# Patient Record
Sex: MALE | Race: WHITE | Employment: OTHER | URBAN - METROPOLITAN AREA
[De-identification: names, ages, dates, MRNs, and addresses within clinical notes are randomized per-mention and may not be internally consistent; named-entity substitution may affect disease eponyms.]

---

## 2017-01-17 PROBLEM — M48.061 NEURAL FORAMINAL STENOSIS OF LUMBAR SPINE: Chronic | Status: ACTIVE | Noted: 2017-01-17

## 2017-10-05 PROBLEM — J44.9 CHRONIC OBSTRUCTIVE PULMONARY DISEASE (HCC): Status: ACTIVE | Noted: 2017-10-05

## 2018-03-12 ENCOUNTER — OFFICE VISIT (OUTPATIENT)
Dept: FAMILY MEDICINE CLINIC | Age: 53
End: 2018-03-12
Payer: MEDICARE

## 2018-03-12 VITALS
TEMPERATURE: 97.8 F | HEIGHT: 72 IN | WEIGHT: 315 LBS | RESPIRATION RATE: 20 BRPM | BODY MASS INDEX: 42.66 KG/M2 | HEART RATE: 68 BPM | OXYGEN SATURATION: 93 % | SYSTOLIC BLOOD PRESSURE: 138 MMHG | DIASTOLIC BLOOD PRESSURE: 78 MMHG

## 2018-03-12 DIAGNOSIS — J44.9 CHRONIC OBSTRUCTIVE PULMONARY DISEASE, UNSPECIFIED COPD TYPE (HCC): ICD-10-CM

## 2018-03-12 DIAGNOSIS — E11.9 TYPE 2 DIABETES MELLITUS WITHOUT COMPLICATION, WITHOUT LONG-TERM CURRENT USE OF INSULIN (HCC): Primary | ICD-10-CM

## 2018-03-12 DIAGNOSIS — E78.2 MIXED HYPERCHOLESTEROLEMIA AND HYPERTRIGLYCERIDEMIA: ICD-10-CM

## 2018-03-12 DIAGNOSIS — F41.9 ANXIETY: ICD-10-CM

## 2018-03-12 DIAGNOSIS — I10 BENIGN ESSENTIAL HTN: ICD-10-CM

## 2018-03-12 DIAGNOSIS — I73.9 PAD (PERIPHERAL ARTERY DISEASE) (HCC): ICD-10-CM

## 2018-03-12 DIAGNOSIS — E66.01 MORBID OBESITY WITH BMI OF 45.0-49.9, ADULT (HCC): ICD-10-CM

## 2018-03-12 DIAGNOSIS — I10 ESSENTIAL HYPERTENSION: ICD-10-CM

## 2018-03-12 PROCEDURE — G8926 SPIRO NO PERF OR DOC: HCPCS | Performed by: FAMILY MEDICINE

## 2018-03-12 PROCEDURE — 3023F SPIROM DOC REV: CPT | Performed by: FAMILY MEDICINE

## 2018-03-12 PROCEDURE — 4004F PT TOBACCO SCREEN RCVD TLK: CPT | Performed by: FAMILY MEDICINE

## 2018-03-12 PROCEDURE — 3017F COLORECTAL CA SCREEN DOC REV: CPT | Performed by: FAMILY MEDICINE

## 2018-03-12 PROCEDURE — G8417 CALC BMI ABV UP PARAM F/U: HCPCS | Performed by: FAMILY MEDICINE

## 2018-03-12 PROCEDURE — G8484 FLU IMMUNIZE NO ADMIN: HCPCS | Performed by: FAMILY MEDICINE

## 2018-03-12 PROCEDURE — G8427 DOCREV CUR MEDS BY ELIG CLIN: HCPCS | Performed by: FAMILY MEDICINE

## 2018-03-12 PROCEDURE — 3046F HEMOGLOBIN A1C LEVEL >9.0%: CPT | Performed by: FAMILY MEDICINE

## 2018-03-12 PROCEDURE — 99214 OFFICE O/P EST MOD 30 MIN: CPT | Performed by: FAMILY MEDICINE

## 2018-03-12 RX ORDER — BUSPIRONE HYDROCHLORIDE 15 MG/1
15 TABLET ORAL 2 TIMES DAILY
Qty: 60 TABLET | Refills: 4 | Status: ON HOLD
Start: 2018-03-12 | End: 2021-01-30

## 2018-03-12 RX ORDER — TORSEMIDE 10 MG/1
10 TABLET ORAL DAILY
Qty: 30 TABLET | Refills: 5 | Status: ON HOLD
Start: 2018-03-12 | End: 2021-02-04 | Stop reason: HOSPADM

## 2018-03-12 RX ORDER — RANITIDINE 300 MG/1
300 TABLET ORAL NIGHTLY
Qty: 90 TABLET | Refills: 0 | Status: ON HOLD
Start: 2018-03-12 | End: 2021-01-30

## 2018-03-12 RX ORDER — LEVOTHYROXINE SODIUM 0.05 MG/1
50 TABLET ORAL DAILY
Qty: 90 TABLET | Refills: 0 | Status: SHIPPED | OUTPATIENT
Start: 2018-03-12

## 2018-03-12 RX ORDER — PRAVASTATIN SODIUM 40 MG
40 TABLET ORAL DAILY
Qty: 30 TABLET | Refills: 5 | Status: ON HOLD
Start: 2018-03-12 | End: 2021-01-30

## 2018-03-12 RX ORDER — PENTOXIFYLLINE 400 MG/1
400 TABLET, EXTENDED RELEASE ORAL
Qty: 90 TABLET | Refills: 5 | Status: SHIPPED | OUTPATIENT
Start: 2018-03-12

## 2018-03-12 RX ORDER — LISINOPRIL 20 MG/1
20 TABLET ORAL DAILY
Qty: 30 TABLET | Refills: 5 | Status: ON HOLD
Start: 2018-03-12 | End: 2021-02-03 | Stop reason: HOSPADM

## 2018-03-12 RX ORDER — METOPROLOL TARTRATE 50 MG/1
50 TABLET, FILM COATED ORAL 2 TIMES DAILY
Qty: 60 TABLET | Refills: 3 | Status: SHIPPED | OUTPATIENT
Start: 2018-03-12

## 2018-03-12 NOTE — PROGRESS NOTES
daily as needed for Pain 5/2/17  Yes Reatha Bernheim, MD   metFORMIN (GLUCOPHAGE) 1000 MG tablet Take 1 tablet by mouth 2 times daily (with meals) 2/1/17  Yes Reatha Bernheim, MD   Aspirin Buf,CaCarb-MgCarb-MgO, 500 MG TABS Take by mouth 2 times daily Take 2 tabs  Stopped 5 days before   Yes Historical Provider, MD   glucose blood VI test strips (FREESTYLE LITE) strip 1 each by In Vitro route daily As needed.  12/12/16  Yes Reatha Bernheim, MD   Lancets MISC 1 each by Does not apply route daily 12/12/16  Yes Reatha Bernheim, MD   glucose monitoring kit (FREESTYLE) monitoring kit 1 kit by Does not apply route 2 times daily 12/12/16  Yes Reatha Bernheim, MD   Alcohol Swabs PADS 2 each by Does not apply route 2 times daily Please give patient 1 box 12/12/16  Yes Reatha Bernheim, MD   canagliflozin [facility]) 300 MG TABS tablet Take 1 tablet by mouth every morning (before breakfast) 12/12/16  Yes Reatha Bernheim, MD   EQ FIBER SUPPLEMENT PO Take 1 capsule by mouth 2 times daily   Yes Historical Provider, MD     Social History     Social History    Marital status: Single     Spouse name: N/A    Number of children: N/A    Years of education: N/A     Social History Main Topics    Smoking status: Former Smoker     Years: 25.00     Types: Cigarettes    Smokeless tobacco: Current User     Types: Chew      Comment: chews    Alcohol use Yes      Comment: occas    Drug use: No    Sexual activity: No     Other Topics Concern    None     Social History Narrative    None       I have reviewed Raulito's allergies, medications, problem list, medical, social and family history and have updated as needed in the electronic medical record  Review Of Systems:    Skin: no abnormal pigmentation, rash, scaling, itching, masses, hair or nail changes  Eyes: no blurring, diplopia, or eye pain  Ears/Nose/Throat: no hearing loss, tinnitus, vertigo, nosebleed, nasal congestion, rhinorrhea, sore throat  Respiratory: no cough, pleuritic chest pain,

## 2018-03-15 ENCOUNTER — TELEPHONE (OUTPATIENT)
Dept: FAMILY MEDICINE CLINIC | Age: 53
End: 2018-03-15

## 2018-03-27 ENCOUNTER — TELEPHONE (OUTPATIENT)
Dept: FAMILY MEDICINE CLINIC | Age: 53
End: 2018-03-27

## 2018-03-27 NOTE — TELEPHONE ENCOUNTER
Call placed to the pharmacy to check on the medication. Per them his med is fine and needs nothing.      Tried to call the patient and was unable to reach or leave a message

## 2018-04-16 ENCOUNTER — TELEPHONE (OUTPATIENT)
Dept: FAMILY MEDICINE CLINIC | Age: 53
End: 2018-04-16

## 2018-05-14 ENCOUNTER — TELEPHONE (OUTPATIENT)
Dept: ADMINISTRATIVE | Age: 53
End: 2018-05-14

## 2018-12-05 PROBLEM — E11.65 TYPE 2 DIABETES MELLITUS WITH HYPERGLYCEMIA, WITHOUT LONG-TERM CURRENT USE OF INSULIN (HCC): Status: ACTIVE | Noted: 2018-12-05

## 2019-08-02 ENCOUNTER — TELEPHONE (OUTPATIENT)
Dept: SURGERY | Age: 54
End: 2019-08-02

## 2020-03-11 ENCOUNTER — TELEPHONE (OUTPATIENT)
Dept: FAMILY MEDICINE CLINIC | Age: 55
End: 2020-03-11

## 2020-03-20 ENCOUNTER — TELEPHONE (OUTPATIENT)
Dept: NEUROLOGY | Age: 55
End: 2020-03-20

## 2020-07-10 ENCOUNTER — TELEPHONE (OUTPATIENT)
Dept: FAMILY MEDICINE CLINIC | Age: 55
End: 2020-07-10

## 2021-01-30 ENCOUNTER — HOSPITAL ENCOUNTER (INPATIENT)
Age: 56
LOS: 5 days | Discharge: HOME HEALTH | DRG: 683 | End: 2021-02-04
Attending: INTERNAL MEDICINE | Admitting: INTERNAL MEDICINE
Payer: MEDICARE

## 2021-01-30 PROBLEM — N17.9 AKI (ACUTE KIDNEY INJURY) (HCC): Status: ACTIVE | Noted: 2021-01-30

## 2021-01-30 LAB
ALBUMIN SERPL-MCNC: 4.5 G/DL (ref 3.5–5.2)
ALP BLD-CCNC: 58 U/L (ref 40–129)
ALT SERPL-CCNC: 5 U/L (ref 0–40)
ANION GAP SERPL CALCULATED.3IONS-SCNC: 15 MMOL/L (ref 7–16)
AST SERPL-CCNC: 17 U/L (ref 0–39)
BILIRUB SERPL-MCNC: 0.4 MG/DL (ref 0–1.2)
BUN BLDV-MCNC: 49 MG/DL (ref 6–20)
CALCIUM SERPL-MCNC: 8.9 MG/DL (ref 8.6–10.2)
CHLORIDE BLD-SCNC: 100 MMOL/L (ref 98–107)
CO2: 22 MMOL/L (ref 22–29)
CREAT SERPL-MCNC: 3 MG/DL (ref 0.7–1.2)
GFR AFRICAN AMERICAN: 26
GFR NON-AFRICAN AMERICAN: 22 ML/MIN/1.73
GLUCOSE BLD-MCNC: 159 MG/DL (ref 74–99)
HCT VFR BLD CALC: 43.5 % (ref 37–54)
HEMOGLOBIN: 13.7 G/DL (ref 12.5–16.5)
MCH RBC QN AUTO: 27.6 PG (ref 26–35)
MCHC RBC AUTO-ENTMCNC: 31.5 % (ref 32–34.5)
MCV RBC AUTO: 87.7 FL (ref 80–99.9)
METER GLUCOSE: 229 MG/DL (ref 74–99)
PDW BLD-RTO: 15.8 FL (ref 11.5–15)
PLATELET # BLD: 267 E9/L (ref 130–450)
PMV BLD AUTO: 11.1 FL (ref 7–12)
POTASSIUM REFLEX MAGNESIUM: 4.9 MMOL/L (ref 3.5–5)
RBC # BLD: 4.96 E12/L (ref 3.8–5.8)
REASON FOR REJECTION: NORMAL
REJECTED TEST: NORMAL
SODIUM BLD-SCNC: 137 MMOL/L (ref 132–146)
TOTAL PROTEIN: 7.1 G/DL (ref 6.4–8.3)
WBC # BLD: 8 E9/L (ref 4.5–11.5)

## 2021-01-30 PROCEDURE — G0378 HOSPITAL OBSERVATION PER HR: HCPCS

## 2021-01-30 PROCEDURE — 96366 THER/PROPH/DIAG IV INF ADDON: CPT

## 2021-01-30 PROCEDURE — 82962 GLUCOSE BLOOD TEST: CPT

## 2021-01-30 PROCEDURE — 85730 THROMBOPLASTIN TIME PARTIAL: CPT

## 2021-01-30 PROCEDURE — 96376 TX/PRO/DX INJ SAME DRUG ADON: CPT

## 2021-01-30 PROCEDURE — 96365 THER/PROPH/DIAG IV INF INIT: CPT

## 2021-01-30 PROCEDURE — 6360000002 HC RX W HCPCS: Performed by: EMERGENCY MEDICINE

## 2021-01-30 PROCEDURE — 36415 COLL VENOUS BLD VENIPUNCTURE: CPT

## 2021-01-30 PROCEDURE — 1200000000 HC SEMI PRIVATE

## 2021-01-30 PROCEDURE — 80053 COMPREHEN METABOLIC PANEL: CPT

## 2021-01-30 PROCEDURE — 85027 COMPLETE CBC AUTOMATED: CPT

## 2021-01-30 PROCEDURE — G0379 DIRECT REFER HOSPITAL OBSERV: HCPCS

## 2021-01-30 PROCEDURE — 2580000003 HC RX 258: Performed by: EMERGENCY MEDICINE

## 2021-01-30 RX ORDER — HEPARIN SODIUM 1000 [USP'U]/ML
10000 INJECTION, SOLUTION INTRAVENOUS; SUBCUTANEOUS ONCE
Status: DISCONTINUED | OUTPATIENT
Start: 2021-01-30 | End: 2021-01-31

## 2021-01-30 RX ORDER — POLYETHYLENE GLYCOL 3350 17 G/17G
17 POWDER, FOR SOLUTION ORAL DAILY PRN
Status: DISCONTINUED | OUTPATIENT
Start: 2021-01-30 | End: 2021-02-04 | Stop reason: HOSPADM

## 2021-01-30 RX ORDER — ONDANSETRON 2 MG/ML
4 INJECTION INTRAMUSCULAR; INTRAVENOUS EVERY 6 HOURS PRN
Status: DISCONTINUED | OUTPATIENT
Start: 2021-01-30 | End: 2021-02-04 | Stop reason: HOSPADM

## 2021-01-30 RX ORDER — HEPARIN SODIUM 1000 [USP'U]/ML
10000 INJECTION, SOLUTION INTRAVENOUS; SUBCUTANEOUS PRN
Status: DISCONTINUED | OUTPATIENT
Start: 2021-01-30 | End: 2021-01-31

## 2021-01-30 RX ORDER — PIOGLITAZONEHYDROCHLORIDE 45 MG/1
45 TABLET ORAL DAILY
Status: ON HOLD | COMMUNITY
End: 2021-02-03 | Stop reason: HOSPADM

## 2021-01-30 RX ORDER — PROMETHAZINE HYDROCHLORIDE 25 MG/1
12.5 TABLET ORAL EVERY 6 HOURS PRN
Status: DISCONTINUED | OUTPATIENT
Start: 2021-01-30 | End: 2021-02-04 | Stop reason: HOSPADM

## 2021-01-30 RX ORDER — ATORVASTATIN CALCIUM 80 MG/1
80 TABLET, FILM COATED ORAL DAILY
COMMUNITY

## 2021-01-30 RX ORDER — LORATADINE 10 MG/1
10 TABLET ORAL DAILY
COMMUNITY

## 2021-01-30 RX ORDER — SODIUM CHLORIDE 0.9 % (FLUSH) 0.9 %
10 SYRINGE (ML) INJECTION PRN
Status: DISCONTINUED | OUTPATIENT
Start: 2021-01-30 | End: 2021-02-04 | Stop reason: HOSPADM

## 2021-01-30 RX ORDER — HEPARIN SODIUM 1000 [USP'U]/ML
5000 INJECTION, SOLUTION INTRAVENOUS; SUBCUTANEOUS PRN
Status: DISCONTINUED | OUTPATIENT
Start: 2021-01-30 | End: 2021-01-31

## 2021-01-30 RX ORDER — ESCITALOPRAM OXALATE 10 MG/1
10 TABLET ORAL DAILY
COMMUNITY

## 2021-01-30 RX ORDER — ACETAMINOPHEN 650 MG/1
650 SUPPOSITORY RECTAL EVERY 6 HOURS PRN
Status: DISCONTINUED | OUTPATIENT
Start: 2021-01-30 | End: 2021-02-04 | Stop reason: HOSPADM

## 2021-01-30 RX ORDER — MELOXICAM 15 MG/1
15 TABLET ORAL DAILY
Status: ON HOLD | COMMUNITY
End: 2021-02-03 | Stop reason: HOSPADM

## 2021-01-30 RX ORDER — EMPAGLIFLOZIN 25 MG/1
25 TABLET, FILM COATED ORAL DAILY
COMMUNITY

## 2021-01-30 RX ORDER — HEPARIN SODIUM 10000 [USP'U]/100ML
5-30 INJECTION, SOLUTION INTRAVENOUS CONTINUOUS
Status: DISCONTINUED | OUTPATIENT
Start: 2021-01-30 | End: 2021-01-31

## 2021-01-30 RX ORDER — ACETAMINOPHEN 325 MG/1
650 TABLET ORAL EVERY 6 HOURS PRN
Status: DISCONTINUED | OUTPATIENT
Start: 2021-01-30 | End: 2021-02-04 | Stop reason: HOSPADM

## 2021-01-30 RX ORDER — SODIUM CHLORIDE 0.9 % (FLUSH) 0.9 %
10 SYRINGE (ML) INJECTION EVERY 12 HOURS SCHEDULED
Status: DISCONTINUED | OUTPATIENT
Start: 2021-01-30 | End: 2021-02-04 | Stop reason: HOSPADM

## 2021-01-30 RX ADMIN — Medication 10 ML: at 21:40

## 2021-01-30 RX ADMIN — HEPARIN SODIUM 18 UNITS/KG/HR: 10000 INJECTION, SOLUTION INTRAVENOUS at 21:15

## 2021-01-30 ASSESSMENT — PAIN DESCRIPTION - ORIENTATION: ORIENTATION: RIGHT

## 2021-01-30 ASSESSMENT — PAIN DESCRIPTION - LOCATION: LOCATION: KNEE

## 2021-01-30 ASSESSMENT — PAIN SCALES - GENERAL: PAINLEVEL_OUTOF10: 0

## 2021-01-31 ENCOUNTER — APPOINTMENT (OUTPATIENT)
Dept: CT IMAGING | Age: 56
DRG: 683 | End: 2021-01-31
Attending: INTERNAL MEDICINE
Payer: MEDICARE

## 2021-01-31 LAB
ANION GAP SERPL CALCULATED.3IONS-SCNC: 16 MMOL/L (ref 7–16)
APTT: 147.8 SEC (ref 24.5–35.1)
APTT: 200.4 SEC (ref 24.5–35.1)
APTT: 30.6 SEC (ref 24.5–35.1)
APTT: 81.5 SEC (ref 24.5–35.1)
BACTERIA: ABNORMAL /HPF
BILIRUBIN URINE: NEGATIVE
BLOOD, URINE: NEGATIVE
BUN BLDV-MCNC: 65 MG/DL (ref 6–20)
CALCIUM SERPL-MCNC: 8.9 MG/DL (ref 8.6–10.2)
CHLORIDE BLD-SCNC: 99 MMOL/L (ref 98–107)
CHLORIDE URINE RANDOM: 27 MMOL/L
CLARITY: CLEAR
CO2: 23 MMOL/L (ref 22–29)
COLOR: YELLOW
CREAT SERPL-MCNC: 2.5 MG/DL (ref 0.7–1.2)
CREATININE URINE: 115 MG/DL (ref 40–278)
D DIMER: 211 NG/ML DDU
GFR AFRICAN AMERICAN: 32
GFR NON-AFRICAN AMERICAN: 27 ML/MIN/1.73
GLUCOSE BLD-MCNC: 161 MG/DL (ref 74–99)
GLUCOSE URINE: >=1000 MG/DL
HCT VFR BLD CALC: 40.4 % (ref 37–54)
HEMOGLOBIN: 12.8 G/DL (ref 12.5–16.5)
KETONES, URINE: NEGATIVE MG/DL
LEUKOCYTE ESTERASE, URINE: NEGATIVE
MCH RBC QN AUTO: 27.7 PG (ref 26–35)
MCHC RBC AUTO-ENTMCNC: 31.7 % (ref 32–34.5)
MCV RBC AUTO: 87.4 FL (ref 80–99.9)
METER GLUCOSE: 152 MG/DL (ref 74–99)
METER GLUCOSE: 152 MG/DL (ref 74–99)
METER GLUCOSE: 157 MG/DL (ref 74–99)
METER GLUCOSE: 163 MG/DL (ref 74–99)
METER GLUCOSE: 165 MG/DL (ref 74–99)
NITRITE, URINE: NEGATIVE
OSMOLALITY URINE: 583 MOSM/KG (ref 300–900)
PDW BLD-RTO: 15.8 FL (ref 11.5–15)
PH UA: 6 (ref 5–9)
PLATELET # BLD: 254 E9/L (ref 130–450)
PMV BLD AUTO: 11.1 FL (ref 7–12)
POTASSIUM SERPL-SCNC: 4.4 MMOL/L (ref 3.5–5)
PROTEIN PROTEIN: 5 MG/DL (ref 0–12)
PROTEIN UA: NEGATIVE MG/DL
PROTEIN/CREAT RATIO: 0
PROTEIN/CREAT RATIO: 0 (ref 0–0.2)
RBC # BLD: 4.62 E12/L (ref 3.8–5.8)
RBC UA: ABNORMAL /HPF (ref 0–2)
SODIUM BLD-SCNC: 138 MMOL/L (ref 132–146)
SODIUM URINE: 63 MMOL/L
SPECIFIC GRAVITY UA: 1.02 (ref 1–1.03)
UROBILINOGEN, URINE: 0.2 E.U./DL
WBC # BLD: 6.4 E9/L (ref 4.5–11.5)
WBC UA: ABNORMAL /HPF (ref 0–5)

## 2021-01-31 PROCEDURE — 6360000002 HC RX W HCPCS: Performed by: INTERNAL MEDICINE

## 2021-01-31 PROCEDURE — 81001 URINALYSIS AUTO W/SCOPE: CPT

## 2021-01-31 PROCEDURE — 84156 ASSAY OF PROTEIN URINE: CPT

## 2021-01-31 PROCEDURE — G0378 HOSPITAL OBSERVATION PER HR: HCPCS

## 2021-01-31 PROCEDURE — 96376 TX/PRO/DX INJ SAME DRUG ADON: CPT

## 2021-01-31 PROCEDURE — 82436 ASSAY OF URINE CHLORIDE: CPT

## 2021-01-31 PROCEDURE — 96372 THER/PROPH/DIAG INJ SC/IM: CPT

## 2021-01-31 PROCEDURE — 6360000002 HC RX W HCPCS: Performed by: EMERGENCY MEDICINE

## 2021-01-31 PROCEDURE — 83935 ASSAY OF URINE OSMOLALITY: CPT

## 2021-01-31 PROCEDURE — 36415 COLL VENOUS BLD VENIPUNCTURE: CPT

## 2021-01-31 PROCEDURE — 85730 THROMBOPLASTIN TIME PARTIAL: CPT

## 2021-01-31 PROCEDURE — 6370000000 HC RX 637 (ALT 250 FOR IP): Performed by: EMERGENCY MEDICINE

## 2021-01-31 PROCEDURE — 84300 ASSAY OF URINE SODIUM: CPT

## 2021-01-31 PROCEDURE — 2580000003 HC RX 258: Performed by: EMERGENCY MEDICINE

## 2021-01-31 PROCEDURE — 82962 GLUCOSE BLOOD TEST: CPT

## 2021-01-31 PROCEDURE — 85027 COMPLETE CBC AUTOMATED: CPT

## 2021-01-31 PROCEDURE — 2500000003 HC RX 250 WO HCPCS: Performed by: INTERNAL MEDICINE

## 2021-01-31 PROCEDURE — 85378 FIBRIN DEGRADE SEMIQUANT: CPT

## 2021-01-31 PROCEDURE — 1200000000 HC SEMI PRIVATE

## 2021-01-31 PROCEDURE — 96366 THER/PROPH/DIAG IV INF ADDON: CPT

## 2021-01-31 PROCEDURE — 99221 1ST HOSP IP/OBS SF/LOW 40: CPT | Performed by: PSYCHIATRY & NEUROLOGY

## 2021-01-31 PROCEDURE — 72131 CT LUMBAR SPINE W/O DYE: CPT

## 2021-01-31 PROCEDURE — 96375 TX/PRO/DX INJ NEW DRUG ADDON: CPT

## 2021-01-31 PROCEDURE — 80048 BASIC METABOLIC PNL TOTAL CA: CPT

## 2021-01-31 PROCEDURE — 82570 ASSAY OF URINE CREATININE: CPT

## 2021-01-31 RX ORDER — LEVOTHYROXINE SODIUM 0.05 MG/1
50 TABLET ORAL
Status: DISCONTINUED | OUTPATIENT
Start: 2021-01-31 | End: 2021-02-04 | Stop reason: HOSPADM

## 2021-01-31 RX ORDER — ESCITALOPRAM OXALATE 10 MG/1
10 TABLET ORAL DAILY
Status: DISCONTINUED | OUTPATIENT
Start: 2021-01-31 | End: 2021-02-04 | Stop reason: HOSPADM

## 2021-01-31 RX ORDER — NICOTINE POLACRILEX 4 MG
15 LOZENGE BUCCAL PRN
Status: DISCONTINUED | OUTPATIENT
Start: 2021-01-31 | End: 2021-02-04 | Stop reason: HOSPADM

## 2021-01-31 RX ORDER — DEXTROSE MONOHYDRATE 50 MG/ML
100 INJECTION, SOLUTION INTRAVENOUS PRN
Status: DISCONTINUED | OUTPATIENT
Start: 2021-01-31 | End: 2021-02-04 | Stop reason: HOSPADM

## 2021-01-31 RX ORDER — HEPARIN SODIUM 10000 [USP'U]/ML
5000 INJECTION, SOLUTION INTRAVENOUS; SUBCUTANEOUS EVERY 8 HOURS
Status: DISCONTINUED | OUTPATIENT
Start: 2021-01-31 | End: 2021-02-04 | Stop reason: HOSPADM

## 2021-01-31 RX ORDER — METOPROLOL TARTRATE 50 MG/1
50 TABLET, FILM COATED ORAL 2 TIMES DAILY
Status: DISCONTINUED | OUTPATIENT
Start: 2021-01-31 | End: 2021-02-04 | Stop reason: HOSPADM

## 2021-01-31 RX ORDER — BUPROPION HYDROCHLORIDE 100 MG/1
100 TABLET ORAL 2 TIMES DAILY
Status: DISCONTINUED | OUTPATIENT
Start: 2021-01-31 | End: 2021-02-04 | Stop reason: HOSPADM

## 2021-01-31 RX ORDER — PENTOXIFYLLINE 400 MG/1
400 TABLET, EXTENDED RELEASE ORAL
Status: DISCONTINUED | OUTPATIENT
Start: 2021-01-31 | End: 2021-02-04 | Stop reason: HOSPADM

## 2021-01-31 RX ORDER — ATORVASTATIN CALCIUM 80 MG/1
80 TABLET, FILM COATED ORAL DAILY
Status: DISCONTINUED | OUTPATIENT
Start: 2021-01-31 | End: 2021-02-04 | Stop reason: HOSPADM

## 2021-01-31 RX ORDER — DEXTROSE MONOHYDRATE 25 G/50ML
12.5 INJECTION, SOLUTION INTRAVENOUS PRN
Status: DISCONTINUED | OUTPATIENT
Start: 2021-01-31 | End: 2021-02-04 | Stop reason: HOSPADM

## 2021-01-31 RX ADMIN — Medication 10 ML: at 07:45

## 2021-01-31 RX ADMIN — BUPROPION HYDROCHLORIDE 100 MG: 100 TABLET, FILM COATED ORAL at 08:58

## 2021-01-31 RX ADMIN — INSULIN LISPRO 2 UNITS: 100 INJECTION, SOLUTION INTRAVENOUS; SUBCUTANEOUS at 09:03

## 2021-01-31 RX ADMIN — INSULIN LISPRO 2 UNITS: 100 INJECTION, SOLUTION INTRAVENOUS; SUBCUTANEOUS at 11:12

## 2021-01-31 RX ADMIN — METOPROLOL TARTRATE 50 MG: 50 TABLET, FILM COATED ORAL at 20:25

## 2021-01-31 RX ADMIN — LEVOTHYROXINE SODIUM 50 MCG: 0.05 TABLET ORAL at 06:28

## 2021-01-31 RX ADMIN — Medication 10 ML: at 20:25

## 2021-01-31 RX ADMIN — HEPARIN SODIUM 15 UNITS/KG/HR: 10000 INJECTION, SOLUTION INTRAVENOUS at 02:00

## 2021-01-31 RX ADMIN — PENTOXIFYLLINE 400 MG: 400 TABLET, EXTENDED RELEASE ORAL at 18:13

## 2021-01-31 RX ADMIN — INSULIN LISPRO 1 UNITS: 100 INJECTION, SOLUTION INTRAVENOUS; SUBCUTANEOUS at 20:30

## 2021-01-31 RX ADMIN — BUPROPION HYDROCHLORIDE 100 MG: 100 TABLET, FILM COATED ORAL at 20:24

## 2021-01-31 RX ADMIN — INSULIN LISPRO 2 UNITS: 100 INJECTION, SOLUTION INTRAVENOUS; SUBCUTANEOUS at 18:14

## 2021-01-31 RX ADMIN — HEPARIN SODIUM 12 UNITS/KG/HR: 10000 INJECTION, SOLUTION INTRAVENOUS at 09:04

## 2021-01-31 RX ADMIN — ACETAMINOPHEN 650 MG: 325 TABLET, FILM COATED ORAL at 08:58

## 2021-01-31 RX ADMIN — HEPARIN SODIUM 5000 UNITS: 10000 INJECTION INTRAVENOUS; SUBCUTANEOUS at 11:13

## 2021-01-31 RX ADMIN — PENTOXIFYLLINE 400 MG: 400 TABLET, EXTENDED RELEASE ORAL at 12:10

## 2021-01-31 RX ADMIN — MICONAZOLE NITRATE: 20.6 POWDER TOPICAL at 20:25

## 2021-01-31 RX ADMIN — INSULIN LISPRO 1 UNITS: 100 INJECTION, SOLUTION INTRAVENOUS; SUBCUTANEOUS at 02:08

## 2021-01-31 RX ADMIN — HEPARIN SODIUM 5000 UNITS: 10000 INJECTION INTRAVENOUS; SUBCUTANEOUS at 18:15

## 2021-01-31 RX ADMIN — METOPROLOL TARTRATE 50 MG: 50 TABLET, FILM COATED ORAL at 08:59

## 2021-01-31 RX ADMIN — ESCITALOPRAM 10 MG: 10 TABLET, FILM COATED ORAL at 08:58

## 2021-01-31 RX ADMIN — HEPARIN SODIUM 12 UNITS/KG/HR: 10000 INJECTION, SOLUTION INTRAVENOUS at 07:59

## 2021-01-31 RX ADMIN — PENTOXIFYLLINE 400 MG: 400 TABLET, EXTENDED RELEASE ORAL at 08:58

## 2021-01-31 RX ADMIN — ACETAMINOPHEN 650 MG: 325 TABLET, FILM COATED ORAL at 01:02

## 2021-01-31 RX ADMIN — ATORVASTATIN CALCIUM 80 MG: 80 TABLET, FILM COATED ORAL at 08:58

## 2021-01-31 ASSESSMENT — PAIN SCALES - GENERAL: PAINLEVEL_OUTOF10: 7

## 2021-01-31 ASSESSMENT — PAIN DESCRIPTION - LOCATION: LOCATION: KNEE

## 2021-01-31 ASSESSMENT — PAIN DESCRIPTION - ONSET: ONSET: ON-GOING

## 2021-01-31 ASSESSMENT — PAIN DESCRIPTION - FREQUENCY: FREQUENCY: INTERMITTENT

## 2021-01-31 NOTE — H&P
INTERNAL MEDICINE HISTORY AND PHYSICAL EXAM     CHIEF COMPLAINT:   No chief complaint on file. HISTORY OF PRESENTING ILLNESS:   63years old male patient who presented to [facility], with chief complaint of pain weakness and decreased sensation of the right lower extremity. Patient has past medical history significant for hypertension hyperlipidemia diabetes mellitus anxiety depression PTSD hypothyroidism. He also has history of peripheral vascular disease GERD COPD obesity obstructive sleep apnea    Apparently he was at home and experienced sudden shooting pain and he collapsed, did not lose the consciousness after that he had leg pain at rest and his leg dangling on the side of the bed, he was unable to pull himself up. He was brought to the emergency department and underwent lab work of, his creatinine was mildly elevated x-ray of the hip wrist knee showed no acute fractures, head CT C-spine and L-spine were requested but when patient went to the CT scanner he underwent a severe panic attack, he has history of PTSD with close tight spaces secondary to a traumatic childhood experience. Patient was noted to have high D-dimer, given concern for VTE, patient was started on heparin drip    Patient was suggested to get a transfer to a different facility so that he can get MRI of the spine, and further studies for evaluating VTE like CTA chest and duplex lower extremity ultrasound.             PAST MEDICAL HISTORY  Past Medical History:   Diagnosis Date    Arthritis     GERD (gastroesophageal reflux disease)     Hyperlipidemia     Hypertension     Thyroid disease     Type 2 diabetes mellitus without complication, without long-term current use of insulin ([facility] [address] 12/12/2016    Type II or unspecified type diabetes mellitus without mention of complication, not stated as uncontrolled 7-    lexiscan stress test       PAST SURGICAL HISTORY  Past Surgical History:   Procedure Laterality Date    INGUINAL HERNIA REPAIR      infant    KNEE ARTHROSCOPY Right     1980's    OTHER SURGICAL HISTORY      cosmetic surgery mouth d/t dog bite    TONSILLECTOMY      VENTRAL HERNIA REPAIR  Sept, 2014    Open       FAMILY HISTORY  Family History   Problem Relation Age of Onset    Heart Disease Mother     Cancer Father           SOCIAL HISTORY   reports that he has quit smoking. His smoking use included cigarettes. He quit after 25.00 years of use. His smokeless tobacco use includes chew. He reports current alcohol use. He reports that he does not use drugs.       MEDICATIONS   Medications Prior to Admission: SITagliptin (JANUVIA) 100 MG tablet, Take 100 mg by mouth daily  atorvastatin (LIPITOR) 80 MG tablet, Take 80 mg by mouth daily  meloxicam (MOBIC) 15 MG tablet, Take 15 mg by mouth daily  empagliflozin (JARDIANCE) 25 MG tablet, Take 25 mg by mouth daily  escitalopram (LEXAPRO) 10 MG tablet, Take 10 mg by mouth daily  loratadine (CLARITIN) 10 MG tablet, Take 10 mg by mouth daily  pioglitazone (ACTOS) 45 MG tablet, Take 45 mg by mouth daily  levothyroxine (LEVOTHROID) 50 MCG tablet, Take 1 tablet by mouth daily  lisinopril (PRINIVIL) 20 MG tablet, Take 1 tablet by mouth daily  torsemide (DEMADEX) 10 MG tablet, Take 1 tablet by mouth daily  pentoxifylline (TRENTAL) 400 MG extended release tablet, Take 1 tablet by mouth 3 times daily (with meals)  metFORMIN (GLUCOPHAGE) 1000 MG tablet, Take 1 tablet by mouth 2 times daily (with meals)  metoprolol tartrate (LOPRESSOR) 50 MG tablet, Take 1 tablet by mouth 2 times daily  buPROPion (WELLBUTRIN) 100 MG tablet, Take 1 tablet by mouth 2 times daily  Cholecalciferol (VITAMIN D3) 5000 units TABS, Take 1 tablet by mouth daily  albuterol sulfate (PROAIR RESPICLICK) 475 (90 Base) MCG/ACT aerosol powder inhalation, Inhale 2 puffs into the lungs every 4 hours as needed for Wheezing or Shortness of Breath  glucose blood VI test strips (FREESTYLE LITE) strip, 1 each by In Vitro route daily As needed.   [DISCONTINUED] ranitidine (ZANTAC) 300 MG tablet, Take 1 tablet by mouth nightly  [DISCONTINUED] pravastatin (PRAVACHOL) 40 MG tablet, Take 1 tablet by mouth daily  [DISCONTINUED] busPIRone (BUSPAR) 15 MG tablet, Take 1 tablet by mouth 2 times daily  [DISCONTINUED] canagliflozin (INVOKANA) 300 MG TABS tablet, Take 1 tablet by mouth every morning (before breakfast)  [DISCONTINUED] cetirizine (ZYRTEC) 10 MG chewable tablet, Take 1 tablet by mouth daily  diclofenac (VOLTAREN) 50 MG EC tablet, Take 1 tablet by mouth daily as needed for Pain  [DISCONTINUED] Aspirin Buf,CaCarb-MgCarb-MgO, 500 MG TABS, Take by mouth 2 times daily Take 2 tabs Stopped 5 days before  Lancets MISC, 1 each by Does not apply route daily  glucose monitoring kit (FREESTYLE) monitoring kit, 1 kit by Does not apply route 2 times daily  Alcohol Swabs PADS, 2 each by Does not apply route 2 times daily Please give patient 1 box  EQ FIBER SUPPLEMENT PO, Take 1 capsule by mouth 2 times daily  Current Facility-Administered Medications   Medication Dose Route Frequency Provider Last Rate Last Admin    heparin (porcine) injection 10,000 Units  10,000 Units Intravenous Once Candy Kline MD        heparin (porcine) injection 10,000 Units  10,000 Units Intravenous PRN Candy Kline MD        heparin (porcine) injection 5,000 Units  5,000 Units Intravenous PRN Candy Kline MD        heparin 25,000 units in dextrose 5% 250 mL (premix) infusion  5-30 Units/kg/hr Intravenous Continuous Candy Kline MD 32.7 mL/hr at 01/30/21 2115 18 Units/kg/hr at 01/30/21 2115    sodium chloride flush 0.9 % injection 10 mL  10 mL Intravenous 2 times per day Candy Kline MD        sodium chloride flush 0.9 % injection 10 mL  10 mL Intravenous PRN Candy Kline MD        promethazine (PHENERGAN) tablet 12.5 mg  12.5 mg Oral Q6H PRN Candy Kline MD        Or    ondansetron [facility]) injection 4 mg  4 mg Intravenous Q6H PRN Renetta El MD        polyethylene glycol (GLYCOLAX) packet 17 g  17 g Oral Daily PRN Renetta El MD        acetaminophen (TYLENOL) tablet 650 mg  650 mg Oral Q6H PRN Renetta El MD        Or    acetaminophen (TYLENOL) suppository 650 mg  650 mg Rectal Q6H PRN Renetta El MD         Prior to Admission medications    Medication Sig Start Date End Date Taking?  Authorizing Provider   SITagliptin (JANUVIA) 100 MG tablet Take 100 mg by mouth daily   Yes Historical Provider, MD   atorvastatin (LIPITOR) 80 MG tablet Take 80 mg by mouth daily   Yes Historical Provider, MD   meloxicam (MOBIC) 15 MG tablet Take 15 mg by mouth daily   Yes Historical Provider, MD   empagliflozin (JARDIANCE) 25 MG tablet Take 25 mg by mouth daily   Yes Historical Provider, MD   escitalopram (LEXAPRO) 10 MG tablet Take 10 mg by mouth daily   Yes Historical Provider, MD   loratadine (CLARITIN) 10 MG tablet Take 10 mg by mouth daily   Yes Historical Provider, MD   pioglitazone (ACTOS) 45 MG tablet Take 45 mg by mouth daily   Yes Historical Provider, MD   levothyroxine (LEVOTHROID) 50 MCG tablet Take 1 tablet by mouth daily 3/12/18  Yes Aileen Ibarra MD   lisinopril (PRINIVIL) 20 MG tablet Take 1 tablet by mouth daily 3/12/18  Yes Aileen Ibarra MD   torsemide (DEMADEX) 10 MG tablet Take 1 tablet by mouth daily 3/12/18  Yes Aileen Ibarra MD   pentoxifylline (TRENTAL) 400 MG extended release tablet Take 1 tablet by mouth 3 times daily (with meals) 3/12/18  Yes Aileen Ibarra MD   metFORMIN (GLUCOPHAGE) 1000 MG tablet Take 1 tablet by mouth 2 times daily (with meals) 3/12/18  Yes Aileen Ibarra MD   metoprolol tartrate (LOPRESSOR) 50 MG tablet Take 1 tablet by mouth 2 times daily 3/12/18  Yes Aileen Ibarra MD   buPROPion [facility]) 100 MG tablet Take 1 tablet by mouth 2 times daily 2/16/18  Yes Yokasta Jacobs APRN - CNP   Cholecalciferol (VITAMIN D3) 5000 units TABS Take 1 tablet by mouth daily 9/5/17 Yes Yessy Ac MD   albuterol sulfate (PROAIR RESPICLICK) 609 (90 Base) MCG/ACT aerosol powder inhalation Inhale 2 puffs into the lungs every 4 hours as needed for Wheezing or Shortness of Breath 3/15/18   Yessy Ac MD   glucose blood VI test strips (FREESTYLE LITE) strip 1 each by In Vitro route daily As needed. 3/12/18   Yessy Ac MD   diclofenac (VOLTAREN) 50 MG EC tablet Take 1 tablet by mouth daily as needed for Pain 5/2/17   Yessy Ac MD   Lancets MISC 1 each by Does not apply route daily 12/12/16   Yessy Ac MD   glucose monitoring kit (FREESTYLE) monitoring kit 1 kit by Does not apply route 2 times daily 12/12/16   Yessy Ac MD   Alcohol Swabs PADS 2 each by Does not apply route 2 times daily Please give patient 1 box 12/12/16   Yessy Ac MD   EQ FIBER SUPPLEMENT PO Take 1 capsule by mouth 2 times daily    Historical Provider, MD       ALLERGIES   Benzalkonium chloride, Codeine, Hydrocodone-acetaminophen, Vicodin [hydrocodone-acetaminophen], and Neosporin [neomycin-polymyx-gramicid]    REVIEW OF SYSTEMS:  12 point review of system was done in detail with the patient and is negative except as above in HPI.     PHYSICAL EXAM:  VS: BP 97/65   Pulse 55   Temp 97 °F (36.1 °C) (Temporal)   Resp 20   Ht 6' (1.829 m)   Wt (!) 400 lb (181.4 kg)   SpO2 94%   BMI 54.25 kg/m²   General Appearance: alert and oriented to person, place and time, in no acute distress  HEENT: normocephalic and atraumatic, pupils equal, round, and reactive to light, Ssupple and non-tender without mass, no thyromegaly   Cardiovascular: normal rate, regular rhythm, normal S1 and S2, no murmurs, rubs, clicks, or gallops, distal pulses intact, no carotid bruits, no JVD  Pulmonary/Chest: clear to auscultation bilaterally- no wheezes, rales or rhonchi, normal air movement, no respiratory distress  Abdomen: soft, non-tender, non-distended, normal bowel sounds, no masses   Extremities: no cyanosis, clubbing or edema, pulse   Musculoskeletal: normal range of motion, no joint swelling, deformity or tenderness  Neurological: alert, oriented, normal speech, no focal findings or movement disorder noted  Skin: warm and dry, no rash or erythema    LABS:  No results found for this or any previous visit (from the past 24 hour(s)). RADIOLOGY:  No results found. ASSESSMENT AND PLAN  Active Problems:    RALF (acute kidney injury) ([facility] [address]  Resolved Problems:    * No resolved hospital problems. *    Impression  1. Right lower extremity weakness  2. Acute kidney injury  3. High D-dimer with a concern for pulmonary [place]  4. History of COPD  5. History of diabetes mellitus  6. Hypertension  7. Hyperlipidemia  8. Morbid obesity  9. Obstructive sleep apnea  10. Patient is full code    Plan  · We will request neurology evaluation for right lower extremity extremity weakness, further imaging per neuro recommendation  · Patient creatinine elevated than baseline, will gently hydrate, will get nephrology evaluation, nephro imaging per nephrology recommendation  · Low suspicion for pulmonary embolism, he was already started on heparin we will continue that for now, will order lower extremity duplex ultrasound, will order nuclear VQ scan  · Resume home medication chronic stable medical condition  · Incoming Hospitalist follow-up      DVT prophylaxis: Heparin  CODE STATUS: Patient is a full code  Discharge plan: Patient can be discharged next 3 to 4 days to home with and without home health.,  Pending clinical improvement, pending work-up and clearance by consulting services. Please note that over 35 minutes was spent in evaluating the patient, review of records and results, discussion with staff/family, etc.    Sade Wilson MD  [facility]   [phone]    NOTE: This report was transcribed using voice recognition software.  Every effort was made to ensure accuracy; however, inadvertent computerized transcription errors may be

## 2021-01-31 NOTE — PROGRESS NOTES
Ok to cancel Lumbar Spine x-rays per Dr. David Castaneda since the patient was able to get a CT scan of the Lumbar spine.

## 2021-01-31 NOTE — PROGRESS NOTES
Hospitalist Progress Note      Synopsis: Patient with a hx of known severe central lumbar canal stenosis admitted on 1/30/2021 as a transfer from [place] due to concern that pt had PE since d dimer was elevated. Pt reports chronic back pain and had an episode of worsening R leg pain and his leg gave out causing a fall.      Subjective    Pt reports continued intermittent back pain and now pain in his R knee  States that he has been trying to lose weight but because of the pandemic hasn't been able to get out  States his meals are limited to what he can do with a microwave  Denies shortness of breath or chest pain  He states that of late he has been taking 6-8 aspirin day for his back pain and he has been doing this for a year  Most recent lab check done 2 months ago and was told labs were normal at that time    Exam:  /62   Pulse 64   Temp 97.3 °F (36.3 °C) (Temporal)   Resp 18   Ht 6' (1.829 m)   Wt (!) 400 lb (181.4 kg)   SpO2 92%   BMI 54.25 kg/m²    General Appearance: alert and oriented to person, place and time, in no acute distress  HEENT: normocephalic and atraumatic, pupils equal, round, and reactive to light, Ssupple and non-tender without mass, no thyromegaly   Cardiovascular: normal rate, regular rhythm, normal S1 and S2, no murmurs, rubs, clicks, or gallops, distal pulses intact, no carotid bruits, no JVD  Pulmonary/Chest: clear to auscultation bilaterally- no wheezes, rales or rhonchi, normal air movement, no respiratory distress  Abdomen: soft, non-tender, non-distended, normal bowel sounds, no masses   Extremities: no cyanosis, clubbing or edema, pulse   Musculoskeletal: normal range of motion, no joint swelling, deformity or tenderness  Neurological: alert, oriented, normal speech, no focal findings or movement disorder noted  Skin: warm and dry, no rash or erythema; abrasion below R knee     Medications:  Reviewed    Infusion Medications    dextrose       Scheduled Medications    insulin lispro  0-12 Units Subcutaneous TID     insulin lispro  0-6 Units Subcutaneous Nightly    atorvastatin  80 mg Oral Daily    buPROPion  100 mg Oral BID    escitalopram  10 mg Oral Daily    levothyroxine  50 mcg Oral QAM AC    metoprolol tartrate  50 mg Oral BID    pentoxifylline  400 mg Oral TID     heparin (porcine)  5,000 Units Subcutaneous Q8H    miconazole   Topical BID    sodium chloride flush  10 mL Intravenous 2 times per day     PRN Meds: glucose, dextrose, glucagon (rDNA), dextrose, sodium chloride flush, promethazine **OR** ondansetron, polyethylene glycol, acetaminophen **OR** acetaminophen    I/O    Intake/Output Summary (Last 24 hours) at 1/31/2021 1547  Last data filed at 1/31/2021 1412  Gross per 24 hour   Intake --   Output 730 ml   Net -730 ml       Labs:   Recent Labs     01/30/21 2115 01/31/21  1010   WBC 8.0 6.4   HGB 13.7 12.8   HCT 43.5 40.4    254       Recent Labs     01/30/21 2115 01/31/21  1010    138   K 4.9 4.4    99   CO2 22 23   BUN 49* 65*   CREATININE 3.0* 2.5*   CALCIUM 8.9 8.9       Recent Labs     01/30/21 2115   PROT 7.1   ALKPHOS 58   ALT 5   AST 17   BILITOT 0.4       No results for input(s): INR in the last 72 hours. No results for input(s): Neldon Docker in the last 72 hours. Chronic labs:  Lab Results   Component Value Date    CHOL 137 01/11/2018    TRIG 382 (H) 01/11/2018    HDL 25 01/11/2018    LDLCALC 36 01/11/2018    TSH 2.500 01/16/2017    PSA 0.13 05/01/2017    INR 1.0 01/17/2017    LABA1C 9.9 01/09/2018       Radiology:  Imaging studies reviewed today.     ASSESSMENT:  RALF  Chronic lower back pain  COPD  DM2  HTN  HLD  Morbid obesity  MARTINEZ  Medical noncompliance    PLAN:  Strict I/O, monitor renal function  XR lumbar spine given that pt does not fit in our CT scanner  Dietician consult  Lifestyle modifications  Glycemic control  Avoid future NSAID use  Continue home meds as ordered   Normal D dimer, no oxygen

## 2021-01-31 NOTE — PLAN OF CARE
Problem: Pain:  Goal: Pain level will decrease  Description: Pain level will decrease  1/31/2021 1412 by Tyrone Sandoval RN  Outcome: Met This Shift     Problem: Pain:  Goal: Control of acute pain  Description: Control of acute pain  1/31/2021 1412 by Tyrone Sandoval RN  Outcome: Met This Shift     Problem: Pain:  Goal: Control of chronic pain  Description: Control of chronic pain  1/31/2021 1412 by Tyrone Sandoval RN  Outcome: Met This Shift     Problem: Skin Integrity:  Goal: Will show no infection signs and symptoms  Description: Will show no infection signs and symptoms  1/31/2021 1412 by Tyrone Sandoval RN  Outcome: Met This Shift     Problem: Skin Integrity:  Goal: Absence of new skin breakdown  Description: Absence of new skin breakdown  1/31/2021 1412 by Tyrone Sandoval RN  Outcome: Met This Shift     Problem: Falls - Risk of:  Goal: Will remain free from falls  Description: Will remain free from falls  1/31/2021 1412 by Tyrone Sandoval RN  Outcome: Met This Shift     Problem: Falls - Risk of:  Goal: Absence of physical injury  Description: Absence of physical injury  1/31/2021 1412 by Tyrone Sandoval RN  Outcome: Met This Shift

## 2021-01-31 NOTE — CONSULTS
Rajeev Shrestha is a 63 y.o. male       No chief complaint on file. CC: right leg weakness  HPI:  50-year-old man with history of known severe central lumbar canal stenosis as diagnosed on CT scan of the L-spine in 2016 presenting after fall with worsening back pain and weakness of the right leg with pain radiating down to the leg. He was previously seen by Dr. Kaley Doshi in 2017 and at that time his main symptoms were back pain and plan was for conservative management. He did follow-up with pain management but reported minimal success  HPI  Prior to Visit Medications    Medication Sig Taking?  Authorizing Provider   SITagliptin (JANUVIA) 100 MG tablet Take 100 mg by mouth daily Yes Historical Provider, MD   atorvastatin (LIPITOR) 80 MG tablet Take 80 mg by mouth daily Yes Historical Provider, MD   meloxicam (MOBIC) 15 MG tablet Take 15 mg by mouth daily Yes Historical Provider, MD   empagliflozin (JARDIANCE) 25 MG tablet Take 25 mg by mouth daily Yes Historical Provider, MD   escitalopram (LEXAPRO) 10 MG tablet Take 10 mg by mouth daily Yes Historical Provider, MD   loratadine (CLARITIN) 10 MG tablet Take 10 mg by mouth daily Yes Historical Provider, MD   pioglitazone (ACTOS) 45 MG tablet Take 45 mg by mouth daily Yes Historical Provider, MD   levothyroxine (LEVOTHROID) 50 MCG tablet Take 1 tablet by mouth daily Yes Yessy Ac MD   lisinopril (PRINIVIL) 20 MG tablet Take 1 tablet by mouth daily Yes Yessy Ac MD   torsemide (DEMADEX) 10 MG tablet Take 1 tablet by mouth daily Yes Yessy Ac MD   pentoxifylline (TRENTAL) 400 MG extended release tablet Take 1 tablet by mouth 3 times daily (with meals) Yes Yessy Ac MD   metFORMIN (GLUCOPHAGE) 1000 MG tablet Take 1 tablet by mouth 2 times daily (with meals) Yes Yessy Ac MD   metoprolol tartrate (LOPRESSOR) 50 MG tablet Take 1 tablet by mouth 2 times daily Yes Yessy Ac MD   buPROPion (WELLBUTRIN) 100 MG tablet Take 1 tablet by mouth 2 times daily Yes Colten Humphrey, APRN - CNP   Cholecalciferol (VITAMIN D3) 5000 units TABS Take 1 tablet by mouth daily Yes Ashish Grey MD   albuterol sulfate (PROAIR RESPICLICK) 618 (90 Base) MCG/ACT aerosol powder inhalation Inhale 2 puffs into the lungs every 4 hours as needed for Wheezing or Shortness of Breath  Ashish Grey MD   glucose blood VI test strips (FREESTYLE LITE) strip 1 each by In Vitro route daily As needed.   Ashish Grey MD   diclofenac (VOLTAREN) 50 MG EC tablet Take 1 tablet by mouth daily as needed for Pain  Ashish Grey MD   Lancets MISC 1 each by Does not apply route daily  Ashish Grey MD   glucose monitoring kit (FREESTYLE) monitoring kit 1 kit by Does not apply route 2 times daily  Ashish Grey MD   Alcohol Swabs PADS 2 each by Does not apply route 2 times daily Please give patient 1 box  Ashish Grey MD   EQ FIBER SUPPLEMENT PO Take 1 capsule by mouth 2 times daily  Historical Provider, MD     Social History     Tobacco Use    Smoking status: Former Smoker     Years: 25.00     Types: Cigarettes    Smokeless tobacco: Current User     Types: Chew    Tobacco comment: chews   Substance Use Topics    Alcohol use: Yes     Comment: occas    Drug use: No     Family History   Problem Relation Age of Onset    Heart Disease Mother     Cancer Father      Past Surgical History:   Procedure Laterality Date    INGUINAL HERNIA REPAIR      infant    KNEE ARTHROSCOPY Right     1980's    OTHER SURGICAL HISTORY      cosmetic surgery mouth d/t dog bite    TONSILLECTOMY      VENTRAL HERNIA REPAIR  Sept, 2014    Open     Past Medical History:   Diagnosis Date    Arthritis     GERD (gastroesophageal reflux disease)     Hyperlipidemia     Hypertension     Thyroid disease     Type 2 diabetes mellitus without complication, without long-term current use of insulin ([facility] [address] 12/12/2016    Type II or unspecified type diabetes mellitus without mention of complication, not stated as uncontrolled 7-    lexiscan stress test     Review of Systems      As above all others negative. Objective:   /62   Pulse 64   Temp 97.3 °F (36.3 °C) (Temporal)   Resp 18   Ht 6' (1.829 m)   Wt (!) 400 lb (181.4 kg)   SpO2 92%   BMI 54.25 kg/m²     Physical Exam  Constitutional:       Appearance: He is obese. HENT:      Head: Normocephalic and atraumatic. Mouth/Throat:      Mouth: Mucous membranes are dry. Eyes:      General: Lids are normal.      Extraocular Movements: Extraocular movements intact. Pupils: Pupils are equal, round, and reactive to light. Neck:      Musculoskeletal: Neck supple. Cardiovascular:      Rate and Rhythm: Normal rate and regular rhythm. Pulmonary:      Effort: Pulmonary effort is normal.   Abdominal:      Palpations: Abdomen is soft. Skin:     General: Skin is warm and dry. Neurological:      Mental Status: He is alert. Deep Tendon Reflexes:      Reflex Scores:       Tricep reflexes are 0 on the right side and 0 on the left side. Bicep reflexes are 0 on the right side and 0 on the left side. Patellar reflexes are 0 on the right side and 0 on the left side. Achilles reflexes are 0 on the right side and 0 on the left side. Psychiatric:         Speech: Speech normal.         Behavior: Behavior normal.                 Neurological Exam  Mental Status  Alert. Recent and remote memory are intact. Speech is normal. Language is fluent with no aphasia. Attention and concentration are normal.    Cranial Nerves  CN II: Visual fields full to confrontation. CN III, IV, VI: Extraocular movements intact bilaterally. Normal lids and orbits bilaterally. Pupils equal round and reactive to light bilaterally. CN V: Facial sensation is normal.  CN VII: Full and symmetric facial movement. CN VIII: Hearing is normal.  CN XI: Shoulder shrug strength is normal.  CN XII: Tongue midline without atrophy or fasciculations.     Motor  Normal muscle bulk throughout. Normal muscle tone. No abnormal involuntary movements. Strength is 5/5 in all four extremities except as noted. Rt le/5 at the ankle in flexion extension inversion eversion. .    Sensory  Sensation is intact to light touch, pinprick, vibration and proprioception in all four extremities. Reflexes                                           Right                      Left  Biceps                                 0                         0  Triceps                                0                         0  Patellar                                0                         0  Achilles                                0                         0    Coordination  Right: Finger-to-nose normal.  Left: Finger-to-nose normal.      Laboratory/Radiology:     CBC with Differential:    Lab Results   Component Value Date    WBC 6.4 2021    RBC 4.62 2021    HGB 12.8 2021    HCT 40.4 2021     2021    MCV 87.4 2021    MCH 27.7 2021    MCHC 31.7 2021    RDW 15.8 2021    SEGSPCT 68 2014    LYMPHOPCT 20.9 2018    MONOPCT 8.3 2018    BASOPCT 0.4 2018    MONOSABS 0.42 2018    LYMPHSABS 1.06 2018    EOSABS 0.16 2018    BASOSABS 0.02 2018     CMP:    Lab Results   Component Value Date     2021    K 4.4 2021    K 4.9 2021    CL 99 2021    CO2 23 2021    BUN 65 2021    CREATININE 2.5 2021    GFRAA 32 2021    LABGLOM 27 2021    GLUCOSE 161 2021    GLUCOSE 126 2012    PROT 7.1 2021    LABALBU 4.5 2021    LABALBU 4.3 2012    CALCIUM 8.9 2021    BILITOT 0.4 2021    ALKPHOS 58 2021    AST 17 2021    ALT 5 2021         I independently reviewed the labs and imaging studies at today's appointment. Assessment:       Suspect worsening lumbar diseases responsible for leg weakness and pain. Plan:     Unclear what imaging will be able to obtain given patient's size. At least a CT scan of areas required. Unable to get that when obtained x-rays and ask neurosurgery to evaluate.       Татьяна Hernandez  3:37 PM  1/31/2021

## 2021-02-01 LAB
ANION GAP SERPL CALCULATED.3IONS-SCNC: 13 MMOL/L (ref 7–16)
BUN BLDV-MCNC: 51 MG/DL (ref 6–20)
CALCIUM SERPL-MCNC: 9.2 MG/DL (ref 8.6–10.2)
CHLORIDE BLD-SCNC: 106 MMOL/L (ref 98–107)
CO2: 22 MMOL/L (ref 22–29)
CREAT SERPL-MCNC: 1.2 MG/DL (ref 0.7–1.2)
GFR AFRICAN AMERICAN: >60
GFR NON-AFRICAN AMERICAN: >60 ML/MIN/1.73
GLUCOSE BLD-MCNC: 128 MG/DL (ref 74–99)
HCT VFR BLD CALC: 43.3 % (ref 37–54)
HEMOGLOBIN: 13.1 G/DL (ref 12.5–16.5)
MAGNESIUM: 2.5 MG/DL (ref 1.6–2.6)
MCH RBC QN AUTO: 26.7 PG (ref 26–35)
MCHC RBC AUTO-ENTMCNC: 30.3 % (ref 32–34.5)
MCV RBC AUTO: 88.2 FL (ref 80–99.9)
METER GLUCOSE: 134 MG/DL (ref 74–99)
METER GLUCOSE: 143 MG/DL (ref 74–99)
METER GLUCOSE: 145 MG/DL (ref 74–99)
METER GLUCOSE: 151 MG/DL (ref 74–99)
PDW BLD-RTO: 15.7 FL (ref 11.5–15)
PHOSPHORUS: 3.6 MG/DL (ref 2.5–4.5)
PLATELET # BLD: 241 E9/L (ref 130–450)
PMV BLD AUTO: 11.5 FL (ref 7–12)
POTASSIUM SERPL-SCNC: 4.7 MMOL/L (ref 3.5–5)
RBC # BLD: 4.91 E12/L (ref 3.8–5.8)
SODIUM BLD-SCNC: 141 MMOL/L (ref 132–146)
WBC # BLD: 5.9 E9/L (ref 4.5–11.5)

## 2021-02-01 PROCEDURE — 96372 THER/PROPH/DIAG INJ SC/IM: CPT

## 2021-02-01 PROCEDURE — 6360000002 HC RX W HCPCS: Performed by: INTERNAL MEDICINE

## 2021-02-01 PROCEDURE — G0378 HOSPITAL OBSERVATION PER HR: HCPCS

## 2021-02-01 PROCEDURE — 82962 GLUCOSE BLOOD TEST: CPT

## 2021-02-01 PROCEDURE — 6370000000 HC RX 637 (ALT 250 FOR IP): Performed by: EMERGENCY MEDICINE

## 2021-02-01 PROCEDURE — 2580000003 HC RX 258: Performed by: EMERGENCY MEDICINE

## 2021-02-01 PROCEDURE — 80048 BASIC METABOLIC PNL TOTAL CA: CPT

## 2021-02-01 PROCEDURE — 85027 COMPLETE CBC AUTOMATED: CPT

## 2021-02-01 PROCEDURE — 2140000000 HC CCU INTERMEDIATE R&B

## 2021-02-01 PROCEDURE — 83735 ASSAY OF MAGNESIUM: CPT

## 2021-02-01 PROCEDURE — 36415 COLL VENOUS BLD VENIPUNCTURE: CPT

## 2021-02-01 PROCEDURE — 84100 ASSAY OF PHOSPHORUS: CPT

## 2021-02-01 RX ADMIN — ATORVASTATIN CALCIUM 80 MG: 80 TABLET, FILM COATED ORAL at 08:22

## 2021-02-01 RX ADMIN — ACETAMINOPHEN 650 MG: 325 TABLET, FILM COATED ORAL at 17:02

## 2021-02-01 RX ADMIN — MICONAZOLE NITRATE: 20.6 POWDER TOPICAL at 08:29

## 2021-02-01 RX ADMIN — METOPROLOL TARTRATE 50 MG: 50 TABLET, FILM COATED ORAL at 23:01

## 2021-02-01 RX ADMIN — PENTOXIFYLLINE 400 MG: 400 TABLET, EXTENDED RELEASE ORAL at 08:23

## 2021-02-01 RX ADMIN — METOPROLOL TARTRATE 50 MG: 50 TABLET, FILM COATED ORAL at 08:22

## 2021-02-01 RX ADMIN — Medication 10 ML: at 08:23

## 2021-02-01 RX ADMIN — PENTOXIFYLLINE 400 MG: 400 TABLET, EXTENDED RELEASE ORAL at 17:02

## 2021-02-01 RX ADMIN — ESCITALOPRAM 10 MG: 10 TABLET, FILM COATED ORAL at 08:22

## 2021-02-01 RX ADMIN — MICONAZOLE NITRATE: 20.6 POWDER TOPICAL at 23:00

## 2021-02-01 RX ADMIN — HEPARIN SODIUM 5000 UNITS: 10000 INJECTION INTRAVENOUS; SUBCUTANEOUS at 03:51

## 2021-02-01 RX ADMIN — ACETAMINOPHEN 650 MG: 325 TABLET, FILM COATED ORAL at 07:00

## 2021-02-01 RX ADMIN — INSULIN LISPRO 2 UNITS: 100 INJECTION, SOLUTION INTRAVENOUS; SUBCUTANEOUS at 11:46

## 2021-02-01 RX ADMIN — HEPARIN SODIUM 5000 UNITS: 10000 INJECTION INTRAVENOUS; SUBCUTANEOUS at 11:47

## 2021-02-01 RX ADMIN — BUPROPION HYDROCHLORIDE 100 MG: 100 TABLET, FILM COATED ORAL at 08:22

## 2021-02-01 RX ADMIN — PENTOXIFYLLINE 400 MG: 400 TABLET, EXTENDED RELEASE ORAL at 11:46

## 2021-02-01 RX ADMIN — LEVOTHYROXINE SODIUM 50 MCG: 0.05 TABLET ORAL at 06:59

## 2021-02-01 RX ADMIN — BUPROPION HYDROCHLORIDE 100 MG: 100 TABLET, FILM COATED ORAL at 23:02

## 2021-02-01 RX ADMIN — HEPARIN SODIUM 5000 UNITS: 10000 INJECTION INTRAVENOUS; SUBCUTANEOUS at 23:04

## 2021-02-01 RX ADMIN — INSULIN LISPRO 1 UNITS: 100 INJECTION, SOLUTION INTRAVENOUS; SUBCUTANEOUS at 23:03

## 2021-02-01 RX ADMIN — INSULIN LISPRO 2 UNITS: 100 INJECTION, SOLUTION INTRAVENOUS; SUBCUTANEOUS at 08:23

## 2021-02-01 RX ADMIN — Medication 10 ML: at 23:01

## 2021-02-01 ASSESSMENT — PAIN SCALES - GENERAL
PAINLEVEL_OUTOF10: 3
PAINLEVEL_OUTOF10: 3

## 2021-02-01 NOTE — CONSULTS
[address]                  [address] [facility], [address] [facility]                                  CONSULTATION    PATIENT NAME: Gera Hernandez                :        1965  MED REC NO:   [id number]                            ROOM:       8207  ACCOUNT NO:   [de-identified]                           ADMIT DATE: 2021  PROVIDER:     Gogo Pollack MD    CONSULT DATE:  2021    REASON FOR CONSULTATION:  Kidney disease. HISTORY OF PRESENT ILLNESS:  The patient is a 54-year-old male  presenting with chief complaint of right lower extremity decreased  sensation and discomfort. He initially presented to [facility] and subsequently transferred to [facility]. He has a history of morbid obesity, history of hypertension, history of  diabetes, hyperlipidemia among other medical problems. He now follows  up with  _____ as an outpatient. I saw him in his room. He is on room air. He is alert. He is  oriented. I was able to look at [address]. His  baseline creatinine used to run around 1. He presented over there with  a creatinine of 2.4 to 2.5. He got an ultrasound of the kidney that was  unrevealing. At home, he is on lisinopril as part of his medication  along with a diuretic. He reports to me he used to take Aleve and ibuprofen on a regular basis  and his primary care physician advised him to quit taking those. Currently, he is on Lipitor, levothyroxine, metoprolol. His blood  pressure is stable at 110/62. REVIEW OF SYSTEMS:  12-point done, negative except for those mentioned  above. ALLERGIES:  No known allergies. PAST MEDICAL HISTORY:  Includes morbid obesity, hypertension, diabetes,  hyperlipidemia. PAST SURGICAL HISTORY:  Includes inguinal hernia and knee replacement. SOCIAL HISTORY:  No smoking. No drinking. FAMILY HISTORY:  Reviewed and noncontributory.

## 2021-02-01 NOTE — FLOWSHEET NOTE
02/01/21 0345   Vital Signs   Temp 96.7 °F (35.9 °C)   Temp Source Temporal   Pulse 64   Heart Rate Source Monitor   Resp 20   BP (!) 163/76   BP Location Right upper arm   MAP (mmHg) 100   Patient Position Sitting   Level of Consciousness Alert (0)   MEWS Score 1   Patient Currently in Pain Yes   Pain Assessment   Pain Level 7   Oxygen Therapy   SpO2 95 %   O2 Device None (Room air)     Post fall - see previous note    Colette Loyola 02/01/21 4:05 AM

## 2021-02-01 NOTE — PROGRESS NOTES
Patient found sitting on floor in bathroom after nurse heard him yell out. He reports that his right knee 'gave out' and he fell down. He denies hitting his head, denies dizziness or feeling lightheaded. He is alert and oriented x4. Denies any new pain. No injuries noted. He was assisted to standing position using bathroom railing and three staff members. Patient ambulated back to bed with walker and standby assistance from staff - with steady gait. He was wearing non-skid slippers. Vital signs stable. Bed alarm activated and call light within reach. Wheels locked, side table close by with personal items within reach. Dr. Katharine Miller notified via [employer]. Jomar Pickens.  4:05 AM 02/01/21

## 2021-02-01 NOTE — PROGRESS NOTES
Hospitalist Progress Note      Synopsis: Patient with a hx of known severe central lumbar canal stenosis admitted on 1/30/2021 as a transfer from [place] due to concern that pt had PE since d dimer was elevated. Pt reports chronic back pain and had an episode of worsening R leg pain and his leg gave out causing a fall.      Subjective    Right leg is stronger  Pain is better    Exam:  /72   Pulse 78   Temp 97.7 °F (36.5 °C) (Oral)   Resp 20   Ht 6' (1.829 m)   Wt (!) 399 lb 6 oz (181.2 kg)   SpO2 92%   BMI 54.16 kg/m²    General Appearance: alert and oriented to person, place and time, in no acute distress  HEENT: normocephalic and atraumatic, pupils equal, round, and reactive to light, Ssupple and non-tender without mass, no thyromegaly   Cardiovascular: normal rate, regular rhythm, normal S1 and S2, no murmurs, rubs, clicks, or gallops, distal pulses intact, no carotid bruits, no JVD  Pulmonary/Chest: clear to auscultation bilaterally- no wheezes, rales or rhonchi, normal air movement, no respiratory distress  Abdomen: soft, non-tender, non-distended, normal bowel sounds, no masses   Extremities: no cyanosis, clubbing or edema, pulse   Musculoskeletal: normal range of motion, no joint swelling, deformity or tenderness  Neurological: alert, oriented, normal speech, no focal findings or movement disorder noted  Skin: warm and dry, no rash or erythema; abrasion below R knee     Medications:  Reviewed    Infusion Medications    dextrose       Scheduled Medications    insulin lispro  0-12 Units Subcutaneous TID WC    insulin lispro  0-6 Units Subcutaneous Nightly    atorvastatin  80 mg Oral Daily    buPROPion  100 mg Oral BID    escitalopram  10 mg Oral Daily    levothyroxine  50 mcg Oral QAM AC    metoprolol tartrate  50 mg Oral BID    pentoxifylline  400 mg Oral TID WC    heparin (porcine)  5,000 Units Subcutaneous Q8H    miconazole   Topical BID    sodium chloride flush  10 mL Intravenous 2 times per day     PRN Meds: glucose, dextrose, glucagon (rDNA), dextrose, sodium chloride flush, promethazine **OR** ondansetron, polyethylene glycol, acetaminophen **OR** acetaminophen    I/O    Intake/Output Summary (Last 24 hours) at 2/1/2021 1229  Last data filed at 2/1/2021 1123  Gross per 24 hour   Intake 360 ml   Output 3400 ml   Net -3040 ml       Labs:   Recent Labs     01/30/21 2115 01/31/21  1010 02/01/21  0731   WBC 8.0 6.4 5.9   HGB 13.7 12.8 13.1   HCT 43.5 40.4 43.3    254 241       Recent Labs     01/30/21 2115 01/31/21  1010    138   K 4.9 4.4    99   CO2 22 23   BUN 49* 65*   CREATININE 3.0* 2.5*   CALCIUM 8.9 8.9       Recent Labs     01/30/21 2115   PROT 7.1   ALKPHOS 58   ALT 5   AST 17   BILITOT 0.4       No results for input(s): INR in the last 72 hours. No results for input(s): Livingston Ripper in the last 72 hours. Chronic labs:  Lab Results   Component Value Date    CHOL 137 01/11/2018    TRIG 382 (H) 01/11/2018    HDL 25 01/11/2018    LDLCALC 36 01/11/2018    TSH 2.500 01/16/2017    PSA 0.13 05/01/2017    INR 1.0 01/17/2017    LABA1C 9.9 01/09/2018       Radiology:  Imaging studies reviewed today. ASSESSMENT:  RALF  Chronic lower back pain  COPD  DM2  HTN  HLD  Morbid obesity  MARTINEZ  Medical noncompliance    PLAN:  Strict I/O, monitor renal function  Ct lumbar spine result noted  Await neurosurgery input    Diet: DIET CARB CONTROL; Carb Control: 5 carb choices (75 gms)/meal  Code Status: Full Code  PT/OT Eval Status:   As needed  DVT Prophylaxis:   heparin  Recommended disposition at discharge:  home w [facility] at [place]     +++++++++++++++++++++++++++++++++++++++++++++++++  Blake Ascencio MD   [facility].  +++++++++++++++++++++++++++++++++++++++++++++++++  NOTE: This report was transcribed using voice recognition software.  Every effort was made to ensure accuracy; however, inadvertent computerized transcription errors may be present.

## 2021-02-01 NOTE — PROCEDURES
Please complete  MRI checklist so pt ready and cleared for [place] if case management decides to have him go there.

## 2021-02-01 NOTE — PLAN OF CARE
Problem: Falls - Risk of:  Goal: Will remain free from falls  Description: Will remain free from falls  2/1/2021 0408 by Ivan Guo RN  Outcome: Not Met This Shift    Problem: Pain:  Goal: Control of chronic pain  Description: Control of chronic pain  2/1/2021 0408 by Ivan Guo RN  Outcome: Ongoing    Problem: Pain:  Goal: Control of acute pain  Description: Control of acute pain  2/1/2021 0408 by Ivan Guo RN  Outcome: Ongoing

## 2021-02-01 NOTE — CARE COORDINATION
2/1/21 Transition of Care: patient is alert and oriented. He resides alone but has help from his family. He does not use assistive devices. He does not have any history with inpatient rehab. He is here due to a fall. His creat is 2. 5(baseline 1.0). Renal on case and holding lasix/lisinopril. Monitoring his labs. He has a neurosurgery consult but is to large for the MRI machine. Ct of spine shows canal stenosis. His pcp is Dr Hernando Yeboah and his pharmacy is [facility] in [place]. Patient currently on bedrest and unable to do PT. OT evals. Will follow for plan.  Jose Antonio Wynn RN CM

## 2021-02-01 NOTE — PLAN OF CARE
CT L spine reviewed-- severe central canal stenosis and neuroforaminal stenosis of lumbar spine     His problems are likely the result of his lumbar disease     NSGY has been consulted. Neuro will be available if needed, please call with questions.  Signing off     Robby Bedoya PA-C

## 2021-02-01 NOTE — PROCEDURES
2/1/21 3:50 pm - Notified Mohini Hernandez RN pt is over table weight limit. RN will see if case management will want to set him up for [address] open mri.

## 2021-02-02 LAB
ANION GAP SERPL CALCULATED.3IONS-SCNC: 15 MMOL/L (ref 7–16)
BUN BLDV-MCNC: 33 MG/DL (ref 6–20)
CALCIUM SERPL-MCNC: 9.1 MG/DL (ref 8.6–10.2)
CHLORIDE BLD-SCNC: 103 MMOL/L (ref 98–107)
CO2: 22 MMOL/L (ref 22–29)
CREAT SERPL-MCNC: 1.1 MG/DL (ref 0.7–1.2)
GFR AFRICAN AMERICAN: >60
GFR NON-AFRICAN AMERICAN: >60 ML/MIN/1.73
GLUCOSE BLD-MCNC: 183 MG/DL (ref 74–99)
METER GLUCOSE: 132 MG/DL (ref 74–99)
METER GLUCOSE: 138 MG/DL (ref 74–99)
METER GLUCOSE: 150 MG/DL (ref 74–99)
METER GLUCOSE: 200 MG/DL (ref 74–99)
POTASSIUM SERPL-SCNC: 4.8 MMOL/L (ref 3.5–5)
SODIUM BLD-SCNC: 140 MMOL/L (ref 132–146)

## 2021-02-02 PROCEDURE — G0378 HOSPITAL OBSERVATION PER HR: HCPCS

## 2021-02-02 PROCEDURE — 36415 COLL VENOUS BLD VENIPUNCTURE: CPT

## 2021-02-02 PROCEDURE — 6360000002 HC RX W HCPCS: Performed by: INTERNAL MEDICINE

## 2021-02-02 PROCEDURE — 6370000000 HC RX 637 (ALT 250 FOR IP): Performed by: EMERGENCY MEDICINE

## 2021-02-02 PROCEDURE — 2580000003 HC RX 258: Performed by: EMERGENCY MEDICINE

## 2021-02-02 PROCEDURE — 2140000000 HC CCU INTERMEDIATE R&B

## 2021-02-02 PROCEDURE — 6360000002 HC RX W HCPCS: Performed by: NURSE PRACTITIONER

## 2021-02-02 PROCEDURE — 96372 THER/PROPH/DIAG INJ SC/IM: CPT

## 2021-02-02 PROCEDURE — 82962 GLUCOSE BLOOD TEST: CPT

## 2021-02-02 PROCEDURE — 96375 TX/PRO/DX INJ NEW DRUG ADDON: CPT

## 2021-02-02 PROCEDURE — 80048 BASIC METABOLIC PNL TOTAL CA: CPT

## 2021-02-02 RX ORDER — FUROSEMIDE 10 MG/ML
40 INJECTION INTRAMUSCULAR; INTRAVENOUS DAILY
Status: DISCONTINUED | OUTPATIENT
Start: 2021-02-02 | End: 2021-02-04 | Stop reason: HOSPADM

## 2021-02-02 RX ADMIN — Medication 10 ML: at 21:15

## 2021-02-02 RX ADMIN — MICONAZOLE NITRATE: 20.6 POWDER TOPICAL at 09:23

## 2021-02-02 RX ADMIN — HEPARIN SODIUM 5000 UNITS: 10000 INJECTION INTRAVENOUS; SUBCUTANEOUS at 19:23

## 2021-02-02 RX ADMIN — BUPROPION HYDROCHLORIDE 100 MG: 100 TABLET, FILM COATED ORAL at 09:24

## 2021-02-02 RX ADMIN — METOPROLOL TARTRATE 50 MG: 50 TABLET, FILM COATED ORAL at 09:23

## 2021-02-02 RX ADMIN — HEPARIN SODIUM 5000 UNITS: 10000 INJECTION INTRAVENOUS; SUBCUTANEOUS at 06:27

## 2021-02-02 RX ADMIN — METOPROLOL TARTRATE 50 MG: 50 TABLET, FILM COATED ORAL at 21:10

## 2021-02-02 RX ADMIN — PENTOXIFYLLINE 400 MG: 400 TABLET, EXTENDED RELEASE ORAL at 11:55

## 2021-02-02 RX ADMIN — HEPARIN SODIUM 5000 UNITS: 10000 INJECTION INTRAVENOUS; SUBCUTANEOUS at 12:05

## 2021-02-02 RX ADMIN — ATORVASTATIN CALCIUM 80 MG: 80 TABLET, FILM COATED ORAL at 09:24

## 2021-02-02 RX ADMIN — INSULIN LISPRO 1 UNITS: 100 INJECTION, SOLUTION INTRAVENOUS; SUBCUTANEOUS at 21:10

## 2021-02-02 RX ADMIN — PENTOXIFYLLINE 400 MG: 400 TABLET, EXTENDED RELEASE ORAL at 17:37

## 2021-02-02 RX ADMIN — Medication 10 ML: at 09:24

## 2021-02-02 RX ADMIN — LEVOTHYROXINE SODIUM 50 MCG: 0.05 TABLET ORAL at 06:28

## 2021-02-02 RX ADMIN — PENTOXIFYLLINE 400 MG: 400 TABLET, EXTENDED RELEASE ORAL at 09:23

## 2021-02-02 RX ADMIN — INSULIN LISPRO 4 UNITS: 100 INJECTION, SOLUTION INTRAVENOUS; SUBCUTANEOUS at 17:37

## 2021-02-02 RX ADMIN — MICONAZOLE NITRATE: 20.6 POWDER TOPICAL at 21:10

## 2021-02-02 RX ADMIN — ACETAMINOPHEN 650 MG: 325 TABLET, FILM COATED ORAL at 12:51

## 2021-02-02 RX ADMIN — FUROSEMIDE 40 MG: 10 INJECTION INTRAMUSCULAR; INTRAVENOUS at 17:37

## 2021-02-02 RX ADMIN — ESCITALOPRAM 10 MG: 10 TABLET, FILM COATED ORAL at 09:24

## 2021-02-02 ASSESSMENT — PAIN SCALES - GENERAL
PAINLEVEL_OUTOF10: 5
PAINLEVEL_OUTOF10: 3

## 2021-02-02 ASSESSMENT — PAIN DESCRIPTION - LOCATION: LOCATION: BACK

## 2021-02-02 ASSESSMENT — PAIN DESCRIPTION - FREQUENCY: FREQUENCY: INTERMITTENT

## 2021-02-02 ASSESSMENT — PAIN DESCRIPTION - ORIENTATION: ORIENTATION: LOWER

## 2021-02-02 NOTE — CARE COORDINATION
2/2/21 Update CM Note: Call placed to [facility] to schedule open mri due to patients size. Questionnaire to be filled out. Per Alyssa Pollack at Open MRI patient cannot be scheduled until 2/3/21 as there are no openings. Scheduled for 2/3/21 at 11:15am to be picked up at 10:15am by [facility] (spoke with Lesly Scott). [phone]. Request for Bariatric stretcher. Per Lesly Scott they will wait and return patient to [facility] room 8207. Confirmation number for transport per Lesly Scott is [phone].   Jonathon Black RN, CM

## 2021-02-02 NOTE — PROGRESS NOTES
Faxed signed MRI checklist, physician order, and facesheet to [facility] for open MRI.

## 2021-02-02 NOTE — PROGRESS NOTES
[facility]   Progress Note    2/2/2021    SUBJECTIVE:   2/1:  Sitting at side of bed. Denies chest pain or SOB. Denies nausea, vomiting or diarrhea. Feeling a bit better. Still having right leg pain. 2/2:  Sitting at side of bed. Denies chest pain or SOB. Denies nausea, vomiting or abd pain. PROBLEM LIST:    Active Problems:    RALF (acute kidney injury) ([address]  Resolved Problems:    * No resolved hospital problems. *         DIET:    DIET CARB CONTROL; Carb Control: 5 carb choices (75 gms)/meal     MEDS (scheduled):    insulin lispro  0-12 Units Subcutaneous TID WC    insulin lispro  0-6 Units Subcutaneous Nightly    atorvastatin  80 mg Oral Daily    buPROPion  100 mg Oral BID    escitalopram  10 mg Oral Daily    levothyroxine  50 mcg Oral QAM AC    metoprolol tartrate  50 mg Oral BID    pentoxifylline  400 mg Oral TID WC    heparin (porcine)  5,000 Units Subcutaneous Q8H    miconazole   Topical BID    sodium chloride flush  10 mL Intravenous 2 times per day       MEDS (infusions):   dextrose         MEDS (prn):  glucose, dextrose, glucagon (rDNA), dextrose, sodium chloride flush, promethazine **OR** ondansetron, polyethylene glycol, acetaminophen **OR** acetaminophen    PHYSICAL EXAM:     Patient Vitals for the past 24 hrs:   BP Temp Temp src Pulse Resp   02/02/21 0920 (!) 154/99 97 °F (36.1 °C) Temporal 74 20   02/01/21 2015 129/69 97 °F (36.1 °C) Temporal 62 18   @      Intake/Output Summary (Last 24 hours) at 2/2/2021 1326  Last data filed at 2/1/2021 1850  Gross per 24 hour   Intake 480 ml   Output 600 ml   Net -120 ml         Wt Readings from Last 3 Encounters:   02/01/21 (!) 399 lb 6 oz (181.2 kg)   03/12/18 (!) 365 lb (165.6 kg)   01/09/18 (!) 356 lb (161.5 kg)       Constitutional:  in no acute distress  Oral: mucus membranes moist  Neck: no JVD. Trachea in midline  Cardiovascular: S1, S2 regular rhythm, no murmur,or rub  Respiratory:  No crackles, no wheeze. CTAB  Gastrointestinal:  Soft, nontender, nondistended, NABS  Ext: no edema, feet warm. Wound right knee. Skin: dry, no rash  Neuro: awake, alert, interactive      DATA:    Recent Labs     01/30/21 2115 01/31/21  1010 02/01/21  0731   WBC 8.0 6.4 5.9   HGB 13.7 12.8 13.1   HCT 43.5 40.4 43.3   MCV 87.7 87.4 88.2    254 241     Recent Labs     01/30/21 2115 01/31/21  1010 02/01/21  1136 02/02/21  0739    138 141 140   K 4.9 4.4 4.7 4.8    99 106 103   CO2 22 23 22 22   MG  --   --  2.5  --    PHOS  --   --  3.6  --    BUN 49* 65* 51* 33*   CREATININE 3.0* 2.5* 1.2 1.1   ALT 5  --   --   --    AST 17  --   --   --    BILITOT 0.4  --   --   --    ALKPHOS 58  --   --   --        Lab Results   Component Value Date    LABPROT 0.0 01/31/2021    LABPROT 0.0 01/31/2021       ASSESSMENT:  1.  Subacute kidney injury to chronic kidney disease. His baseline creatinine in 2020 was around 1. His current creatinine is around 2.5.   2.  Diabetes Mellitus  3. Hypertension  4.   HLD  5  Morbid obesity    -renal function stable - creatinine down to 1.1 mg/dl  -BP wnl this AM    RECOMMENDATIONS:  -Resume lasix qd  -monitor labs  -Monitor UO  -Monitor BP    Electronically signed by ASHLEY Bailon - CNP on 2/2/2021 at 1:26 PM

## 2021-02-02 NOTE — CONSULTS
the BMI of 54.25. He is alert, awake and oriented to time, place and  person. NEUROLOGIC:  Speech is good. Memory is good. Cranial nerves are  grossly intact. Pupils are equal and reactive. Extraocular movements  are full. Vision is full to confrontation. No focal motor or sensory  deficit noted in the upper extremities. He does have difficulty lifting  his right leg off the bed and also has an abrasion in the shin below the  knee on the right side. No significant tenderness noted over the  cervical, thoracic or lumbar spine. Rest of the neurologic examination  is intact. Impression: Lumbar spondylosis & radiculopathy  Morbid Obesity    RECOMMENDATIONS:  MRI scan of the cervical and lumbar spine. As the  patient is 400 pounds, would not be able to complete his test in the  hospital, has to be transported to other facility for the same. After  the MRI scan is completed, discussed with him the option of management  of his symptoms. We will follow along.         Berna De La Fuente MD    D: 02/01/2021 17:30:30       T: 02/01/2021 17:35:17     [email or web address]  Job#: [id number]     Doc#: [id number]    CC:

## 2021-02-02 NOTE — PROGRESS NOTES
Hospitalist Progress Note      Synopsis: Patient with a hx of known severe central lumbar canal stenosis admitted on 1/30/2021 as a transfer from [place] due to concern that pt had PE since d dimer was elevated. Pt reports chronic back pain and had an episode of worsening R leg pain and his leg gave out causing a fall.      Subjective    Denies new issues      Exam:  /69   Pulse 62   Temp 97 °F (36.1 °C) (Temporal)   Resp 18   Ht 6' (1.829 m)   Wt (!) 399 lb 6 oz (181.2 kg)   SpO2 92%   BMI 54.16 kg/m²    General Appearance: alert and oriented to person, place and time, in no acute distress  HEENT: normocephalic and atraumatic, pupils equal, round, and reactive to light, Ssupple and non-tender without mass, no thyromegaly   Cardiovascular: normal rate, regular rhythm, normal S1 and S2, no murmurs, rubs, clicks, or gallops, distal pulses intact, no carotid bruits, no JVD  Pulmonary/Chest: clear to auscultation bilaterally- no wheezes, rales or rhonchi, normal air movement, no respiratory distress  Abdomen: soft, non-tender, non-distended, normal bowel sounds, no masses   Extremities: no cyanosis, clubbing or edema, pulse   Musculoskeletal: normal range of motion, no joint swelling, deformity or tenderness  Neurological: alert, oriented, normal speech, no focal findings or movement disorder noted  Skin: warm and dry, no rash or erythema; abrasion below R knee     Medications:  Reviewed    Infusion Medications    dextrose       Scheduled Medications    insulin lispro  0-12 Units Subcutaneous TID WC    insulin lispro  0-6 Units Subcutaneous Nightly    atorvastatin  80 mg Oral Daily    buPROPion  100 mg Oral BID    escitalopram  10 mg Oral Daily    levothyroxine  50 mcg Oral QAM AC    metoprolol tartrate  50 mg Oral BID    pentoxifylline  400 mg Oral TID WC    heparin (porcine)  5,000 Units Subcutaneous Q8H    miconazole   Topical BID    sodium chloride flush  10 mL Intravenous 2 times per day     PRN Meds: glucose, dextrose, glucagon (rDNA), dextrose, sodium chloride flush, promethazine **OR** ondansetron, polyethylene glycol, acetaminophen **OR** acetaminophen    I/O    Intake/Output Summary (Last 24 hours) at 2/2/2021 0857  Last data filed at 2/1/2021 1850  Gross per 24 hour   Intake 840 ml   Output 1200 ml   Net -360 ml       Labs:   Recent Labs     01/30/21 2115 01/31/21  1010 02/01/21  0731   WBC 8.0 6.4 5.9   HGB 13.7 12.8 13.1   HCT 43.5 40.4 43.3    254 241       Recent Labs     01/31/21  1010 02/01/21  1136 02/02/21  0739    141 140   K 4.4 4.7 4.8   CL 99 106 103   CO2 23 22 22   BUN 65* 51* 33*   CREATININE 2.5* 1.2 1.1   CALCIUM 8.9 9.2 9.1   PHOS  --  3.6  --        Recent Labs     01/30/21 2115   PROT 7.1   ALKPHOS 58   ALT 5   AST 17   BILITOT 0.4       No results for input(s): INR in the last 72 hours. No results for input(s): Lewanda Bene in the last 72 hours. Chronic labs:  Lab Results   Component Value Date    CHOL 137 01/11/2018    TRIG 382 (H) 01/11/2018    HDL 25 01/11/2018    LDLCALC 36 01/11/2018    TSH 2.500 01/16/2017    PSA 0.13 05/01/2017    INR 1.0 01/17/2017    LABA1C 9.9 01/09/2018       Radiology:  Imaging studies reviewed today. ASSESSMENT:  RALF  Chronic lower back pain  COPD  DM2  HTN  HLD  Morbid obesity  MARTINEZ  Medical noncompliance    PLAN:  Strict I/O, monitor renal function  MRI cervical and lumbar spines pending  Neurosurgery following    Diet: DIET CARB CONTROL; Carb Control: 5 carb choices (75 gms)/meal  Code Status: Full Code  PT/OT Eval Status:   As needed  DVT Prophylaxis:   heparin  Recommended disposition at discharge:  home w hhc [place]     +++++++++++++++++++++++++++++++++++++++++++++++++  Sudha Mckeon MD   [facility].  +++++++++++++++++++++++++++++++++++++++++++++++++  NOTE: This report was transcribed using voice recognition software.  Every effort was made to ensure accuracy; however, inadvertent computerized transcription errors may be present.

## 2021-02-02 NOTE — PROGRESS NOTES
Lumbar Spondylosis. Obesity. Awaiting MRI scan to be completed.   Will make recommendations after reviewing that

## 2021-02-03 LAB
ANION GAP SERPL CALCULATED.3IONS-SCNC: 15 MMOL/L (ref 7–16)
BUN BLDV-MCNC: 35 MG/DL (ref 6–20)
CALCIUM SERPL-MCNC: 9.1 MG/DL (ref 8.6–10.2)
CHLORIDE BLD-SCNC: 100 MMOL/L (ref 98–107)
CO2: 22 MMOL/L (ref 22–29)
CREAT SERPL-MCNC: 1.2 MG/DL (ref 0.7–1.2)
GFR AFRICAN AMERICAN: >60
GFR NON-AFRICAN AMERICAN: >60 ML/MIN/1.73
GLUCOSE BLD-MCNC: 141 MG/DL (ref 74–99)
HBA1C MFR BLD: 7.2 % (ref 4–5.6)
HCT VFR BLD CALC: 41.6 % (ref 37–54)
HEMOGLOBIN: 13.4 G/DL (ref 12.5–16.5)
MCH RBC QN AUTO: 27.5 PG (ref 26–35)
MCHC RBC AUTO-ENTMCNC: 32.2 % (ref 32–34.5)
MCV RBC AUTO: 85.4 FL (ref 80–99.9)
METER GLUCOSE: 135 MG/DL (ref 74–99)
METER GLUCOSE: 145 MG/DL (ref 74–99)
METER GLUCOSE: 154 MG/DL (ref 74–99)
METER GLUCOSE: 196 MG/DL (ref 74–99)
PDW BLD-RTO: 15.5 FL (ref 11.5–15)
PLATELET # BLD: 240 E9/L (ref 130–450)
PMV BLD AUTO: 11.2 FL (ref 7–12)
POTASSIUM SERPL-SCNC: 4 MMOL/L (ref 3.5–5)
RBC # BLD: 4.87 E12/L (ref 3.8–5.8)
SODIUM BLD-SCNC: 137 MMOL/L (ref 132–146)
WBC # BLD: 6.4 E9/L (ref 4.5–11.5)

## 2021-02-03 PROCEDURE — 6370000000 HC RX 637 (ALT 250 FOR IP): Performed by: INTERNAL MEDICINE

## 2021-02-03 PROCEDURE — 80048 BASIC METABOLIC PNL TOTAL CA: CPT

## 2021-02-03 PROCEDURE — 85027 COMPLETE CBC AUTOMATED: CPT

## 2021-02-03 PROCEDURE — 96376 TX/PRO/DX INJ SAME DRUG ADON: CPT

## 2021-02-03 PROCEDURE — 6360000002 HC RX W HCPCS: Performed by: NURSE PRACTITIONER

## 2021-02-03 PROCEDURE — 82962 GLUCOSE BLOOD TEST: CPT

## 2021-02-03 PROCEDURE — 6370000000 HC RX 637 (ALT 250 FOR IP): Performed by: FAMILY MEDICINE

## 2021-02-03 PROCEDURE — G0378 HOSPITAL OBSERVATION PER HR: HCPCS

## 2021-02-03 PROCEDURE — 36415 COLL VENOUS BLD VENIPUNCTURE: CPT

## 2021-02-03 PROCEDURE — 96372 THER/PROPH/DIAG INJ SC/IM: CPT

## 2021-02-03 PROCEDURE — 6370000000 HC RX 637 (ALT 250 FOR IP): Performed by: EMERGENCY MEDICINE

## 2021-02-03 PROCEDURE — 2580000003 HC RX 258: Performed by: EMERGENCY MEDICINE

## 2021-02-03 PROCEDURE — 2140000000 HC CCU INTERMEDIATE R&B

## 2021-02-03 PROCEDURE — 83036 HEMOGLOBIN GLYCOSYLATED A1C: CPT

## 2021-02-03 PROCEDURE — 6360000002 HC RX W HCPCS: Performed by: INTERNAL MEDICINE

## 2021-02-03 RX ORDER — LISINOPRIL 5 MG/1
5 TABLET ORAL DAILY
Qty: 30 TABLET | Refills: 0 | Status: SHIPPED | OUTPATIENT
Start: 2021-02-04

## 2021-02-03 RX ORDER — LISINOPRIL 5 MG/1
5 TABLET ORAL DAILY
Status: DISCONTINUED | OUTPATIENT
Start: 2021-02-03 | End: 2021-02-04 | Stop reason: HOSPADM

## 2021-02-03 RX ADMIN — BUPROPION HYDROCHLORIDE 100 MG: 100 TABLET, FILM COATED ORAL at 08:15

## 2021-02-03 RX ADMIN — ESCITALOPRAM 10 MG: 10 TABLET, FILM COATED ORAL at 08:15

## 2021-02-03 RX ADMIN — Medication 10 ML: at 08:15

## 2021-02-03 RX ADMIN — Medication 10 ML: at 22:06

## 2021-02-03 RX ADMIN — BUPROPION HYDROCHLORIDE 100 MG: 100 TABLET, FILM COATED ORAL at 00:15

## 2021-02-03 RX ADMIN — HEPARIN SODIUM 5000 UNITS: 10000 INJECTION INTRAVENOUS; SUBCUTANEOUS at 04:14

## 2021-02-03 RX ADMIN — BUPROPION HYDROCHLORIDE 100 MG: 100 TABLET, FILM COATED ORAL at 20:34

## 2021-02-03 RX ADMIN — FUROSEMIDE 40 MG: 10 INJECTION INTRAMUSCULAR; INTRAVENOUS at 12:29

## 2021-02-03 RX ADMIN — INSULIN LISPRO 2 UNITS: 100 INJECTION, SOLUTION INTRAVENOUS; SUBCUTANEOUS at 16:57

## 2021-02-03 RX ADMIN — LEVOTHYROXINE SODIUM 50 MCG: 0.05 TABLET ORAL at 06:39

## 2021-02-03 RX ADMIN — INSULIN LISPRO 1 UNITS: 100 INJECTION, SOLUTION INTRAVENOUS; SUBCUTANEOUS at 20:35

## 2021-02-03 RX ADMIN — MICONAZOLE NITRATE: 20.6 POWDER TOPICAL at 12:42

## 2021-02-03 RX ADMIN — METOPROLOL TARTRATE 50 MG: 50 TABLET, FILM COATED ORAL at 20:34

## 2021-02-03 RX ADMIN — ATORVASTATIN CALCIUM 80 MG: 80 TABLET, FILM COATED ORAL at 08:14

## 2021-02-03 RX ADMIN — LISINOPRIL 5 MG: 5 TABLET ORAL at 12:43

## 2021-02-03 RX ADMIN — HEPARIN SODIUM 5000 UNITS: 10000 INJECTION INTRAVENOUS; SUBCUTANEOUS at 20:34

## 2021-02-03 RX ADMIN — ACETAMINOPHEN 650 MG: 325 TABLET, FILM COATED ORAL at 00:18

## 2021-02-03 RX ADMIN — SODIUM CHLORIDE, PRESERVATIVE FREE 10 ML: 5 INJECTION INTRAVENOUS at 12:29

## 2021-02-03 RX ADMIN — PENTOXIFYLLINE 400 MG: 400 TABLET, EXTENDED RELEASE ORAL at 08:15

## 2021-02-03 RX ADMIN — ACETAMINOPHEN 650 MG: 325 TABLET, FILM COATED ORAL at 08:14

## 2021-02-03 RX ADMIN — HEPARIN SODIUM 5000 UNITS: 10000 INJECTION INTRAVENOUS; SUBCUTANEOUS at 12:46

## 2021-02-03 RX ADMIN — BENZOCAINE AND MENTHOL 1 LOZENGE: 15; 2.6 LOZENGE ORAL at 00:11

## 2021-02-03 RX ADMIN — INSULIN LISPRO 2 UNITS: 100 INJECTION, SOLUTION INTRAVENOUS; SUBCUTANEOUS at 12:46

## 2021-02-03 RX ADMIN — PENTOXIFYLLINE 400 MG: 400 TABLET, EXTENDED RELEASE ORAL at 12:40

## 2021-02-03 RX ADMIN — PENTOXIFYLLINE 400 MG: 400 TABLET, EXTENDED RELEASE ORAL at 16:57

## 2021-02-03 RX ADMIN — MICONAZOLE NITRATE: 20.6 POWDER TOPICAL at 20:34

## 2021-02-03 RX ADMIN — METOPROLOL TARTRATE 50 MG: 50 TABLET, FILM COATED ORAL at 08:15

## 2021-02-03 ASSESSMENT — PAIN DESCRIPTION - PAIN TYPE: TYPE: ACUTE PAIN

## 2021-02-03 ASSESSMENT — PAIN DESCRIPTION - DESCRIPTORS: DESCRIPTORS: CONSTANT;DULL;THROBBING

## 2021-02-03 ASSESSMENT — PAIN DESCRIPTION - LOCATION: LOCATION: KNEE

## 2021-02-03 ASSESSMENT — PAIN SCALES - GENERAL
PAINLEVEL_OUTOF10: 8
PAINLEVEL_OUTOF10: 0
PAINLEVEL_OUTOF10: 8

## 2021-02-03 ASSESSMENT — PAIN DESCRIPTION - FREQUENCY: FREQUENCY: INTERMITTENT

## 2021-02-03 NOTE — PROGRESS NOTES
Lumbar Spondylosis. Morbid Obesity. Awaiting MRI scan to be completed.   Will make recommendations after reviewing that

## 2021-02-03 NOTE — PROGRESS NOTES
Patient unable to be discharged this evening- he states he cannot get into his residence without a walker. Only DME company able to deliver this evening, which is out of network. Patient wishes to use an in-network DME company for walker. This will be coordinated with social work in the morning. Additionally, patient states he does not have a ride this evening and would need to call the fire department.

## 2021-02-03 NOTE — PROGRESS NOTES
Received call from [place] at [address]. Patient refused to have MRI done, stating he did not think he would fit. [place] states that patient would have fit in MRI machine, but refused to try. Dr Royce Canavan notified via telephone.

## 2021-02-03 NOTE — PROGRESS NOTES
Hospitalist Progress Note      SYNOPSIS: Patient admitted on 2021 presented to [facility], with chief complaint of pain weakness and decreased sensation of the right lower extremity. Patient has past medical history significant for hypertension hyperlipidemia diabetes mellitus anxiety depression PTSD hypothyroidism. He also has history of peripheral vascular disease GERD COPD obesity obstructive sleep apnea     Apparently he was at home and experienced sudden shooting pain and he collapsed, did not lose the consciousness after that he had leg pain at rest and his leg dangling on the side of the bed, he was unable to pull himself up. He was brought to the emergency department and underwent lab work of, his creatinine was mildly elevated x-ray of the hip wrist knee showed no acute fractures, head CT C-spine and L-spine were requested but when patient went to the CT scanner he underwent a severe panic attack, he has history of PTSD with close tight spaces secondary to a traumatic childhood experience.     Patient was noted to have high D-dimer, given concern for VTE, patient was started on heparin drip     Patient was suggested to get a transfer to a different facility so that he can get MRI of the spine, and further studies for evaluating VTE like CTA chest and duplex lower extremity ultrasound. SUBJECTIVE:  Stable overnight. No other overnight issues reported. Patient seen and examined  Records reviewed.    For open MRI today  Complains of right leg pain    Temp (24hrs), Av.5 °F (36.4 °C), Min:96.2 °F (35.7 °C), Max:98.5 °F (36.9 °C)    DIET: DIET CARB CONTROL; Carb Control: 5 carb choices (75 gms)/meal  CODE: Full Code    Intake/Output Summary (Last 24 hours) at 2/3/2021 3455  Last data filed at 2/3/2021 0407  Gross per 24 hour   Intake 720 ml   Output 0 ml   Net 720 ml       Review of Systems  All bolded are positive; please see HPI  General:  Fever, chills, diaphoresis, SSI, Check hemoglobin A1c. · Hypertension- Restart low dose lisinopril due to kidney function returning to normal limits and HTN in the setting of DM  · Hyperlipidemia- Statin  · Morbid obesity Class III  · MARTINEZ  · Medical noncompliance    Due to patient obesity, lumbar stenosis, and ambulatory dysfunction, patient requires a wheeled walker. He states he used crutches previously and he fell with these. DISPOSITION: Awaiting MRI    Medications:  REVIEWED DAILY    Infusion Medications    dextrose       Scheduled Medications    furosemide  40 mg Intravenous Daily    insulin lispro  0-12 Units Subcutaneous TID WC    insulin lispro  0-6 Units Subcutaneous Nightly    atorvastatin  80 mg Oral Daily    buPROPion  100 mg Oral BID    escitalopram  10 mg Oral Daily    levothyroxine  50 mcg Oral QAM AC    metoprolol tartrate  50 mg Oral BID    pentoxifylline  400 mg Oral TID WC    heparin (porcine)  5,000 Units Subcutaneous Q8H    miconazole   Topical BID    sodium chloride flush  10 mL Intravenous 2 times per day     PRN Meds: Benzocaine-Menthol, glucose, dextrose, glucagon (rDNA), dextrose, sodium chloride flush, promethazine **OR** ondansetron, polyethylene glycol, acetaminophen **OR** acetaminophen    Labs:     Recent Labs     01/31/21  1010 02/01/21  0731 02/03/21  0600   WBC 6.4 5.9 6.4   HGB 12.8 13.1 13.4   HCT 40.4 43.3 41.6    241 240       Recent Labs     02/01/21  1136 02/02/21  0739 02/03/21  0600    140 137   K 4.7 4.8 4.0    103 100   CO2 22 22 22   BUN 51* 33* 35*   CREATININE 1.2 1.1 1.2   CALCIUM 9.2 9.1 9.1   PHOS 3.6  --   --        No results for input(s): PROT, ALB, ALKPHOS, ALT, AST, BILITOT, AMYLASE, LIPASE in the last 72 hours. No results for input(s): INR in the last 72 hours. No results for input(s): Nay Fidelina in the last 72 hours.     Chronic labs:    Lab Results   Component Value Date    CHOL 137 01/11/2018    TRIG 382 (H) 01/11/2018    HDL 25 01/11/2018    LDLCALC 36 01/11/2018    TSH 2.500 01/16/2017    PSA 0.13 05/01/2017    INR 1.0 01/17/2017    LABA1C 9.9 01/09/2018       Radiology: REVIEWED DAILY    +++++++++++++++++++++++++++++++++++++++++++++++++  Karan Gutierrez Physician - [address] [place] [address]  +++++++++++++++++++++++++++++++++++++++++++++++++  NOTE: This report was transcribed using voice recognition software. Every effort was made to ensure accuracy; however, inadvertent computerized transcription errors may be present.

## 2021-02-03 NOTE — PROGRESS NOTES
[facility]   Progress Note    2/3/2021    SUBJECTIVE:   2/1:  Sitting at side of bed. Denies chest pain or SOB. Denies nausea, vomiting or diarrhea. Feeling a bit better. Still having right leg pain. 2/2:  Sitting at side of bed. Denies chest pain or SOB. Denies nausea, vomiting or abd pain. 2/3: Patient was not in his room my conversation with the nurse was he was at open MRI ordered by neurosurgery for his back pain for the future recommendations and plan of therapy. PROBLEM LIST:    Active Problems:    RALF (acute kidney injury) ([address]  Resolved Problems:    * No resolved hospital problems.  *         DIET:    DIET CARB CONTROL; Carb Control: 5 carb choices (75 gms)/meal     MEDS (scheduled):    lisinopril  5 mg Oral Daily    furosemide  40 mg Intravenous Daily    insulin lispro  0-12 Units Subcutaneous TID WC    insulin lispro  0-6 Units Subcutaneous Nightly    atorvastatin  80 mg Oral Daily    buPROPion  100 mg Oral BID    escitalopram  10 mg Oral Daily    levothyroxine  50 mcg Oral QAM AC    metoprolol tartrate  50 mg Oral BID    pentoxifylline  400 mg Oral TID WC    heparin (porcine)  5,000 Units Subcutaneous Q8H    miconazole   Topical BID    sodium chloride flush  10 mL Intravenous 2 times per day       MEDS (infusions):   dextrose         MEDS (prn):  Benzocaine-Menthol, glucose, dextrose, glucagon (rDNA), dextrose, sodium chloride flush, promethazine **OR** ondansetron, polyethylene glycol, acetaminophen **OR** acetaminophen    PHYSICAL EXAM:     Patient Vitals for the past 24 hrs:   BP Temp Temp src Pulse Resp SpO2 Weight   02/03/21 0804 (!) 157/91 97.7 °F (36.5 °C) Oral 54 17 92 % --   02/03/21 0700 (!) 144/87 97.8 °F (36.6 °C) Temporal 63 20 95 % --   02/03/21 0407 -- -- -- -- -- -- (!) 392 lb 12.8 oz (178.2 kg)   02/03/21 0400 (!) 148/77 98.5 °F (36.9 °C) Temporal 58 20 93 % --   02/03/21 0030 (!) 146/76 98.4 °F (36.9 °C) Temporal 96 18 95 % --   02/02/21 0724 (!) 164/90 97 °F (36.1 °C) Temporal 60 20 95 % --   02/02/21 2015 (!) 167/96 97 °F (36.1 °C) Temporal 63 20 92 % --   02/02/21 1720 (!) 151/98 96.2 °F (35.7 °C) -- 66 18 96 % --   @      Intake/Output Summary (Last 24 hours) at 2/3/2021 1230  Last data filed at 2/3/2021 0859  Gross per 24 hour   Intake 600 ml   Output 250 ml   Net 350 ml         Wt Readings from Last 3 Encounters:   02/03/21 (!) 392 lb 12.8 oz (178.2 kg)   03/12/18 (!) 365 lb (165.6 kg)   01/09/18 (!) 356 lb (161.5 kg)       Constitutional:  in no acute distress  Oral: mucus membranes moist  Neck: no JVD. Trachea in midline  Cardiovascular: S1, S2 regular rhythm, no murmur,or rub  Respiratory:  No crackles, no wheeze. CTAB  Gastrointestinal:  Soft, nontender, nondistended, NABS  Ext: no edema, feet warm. Wound right knee. Skin: dry, no rash  Neuro: awake, alert, interactive      DATA:    Recent Labs     02/01/21  0731 02/03/21  0600   WBC 5.9 6.4   HGB 13.1 13.4   HCT 43.3 41.6   MCV 88.2 85.4    240     Recent Labs     02/01/21  1136 02/02/21  0739 02/03/21  0600    140 137   K 4.7 4.8 4.0    103 100   CO2 22 22 22   MG 2.5  --   --    PHOS 3.6  --   --    BUN 51* 33* 35*   CREATININE 1.2 1.1 1.2       Lab Results   Component Value Date    LABPROT 0.0 01/31/2021    LABPROT 0.0 01/31/2021       ASSESSMENT:  1.  Subacute kidney injury to chronic kidney disease. His baseline creatinine in 2020 was around 1. His current creatinine is around 2.5.   2.  Diabetes Mellitus  3. Hypertension  4.   HLD  5  Morbid obesity    -renal function stable - creatinine down to 1.1 mg/dl  -BP wnl this AM    RECOMMENDATIONS:  -Resume lasix qd  -monitor labs  -Monitor UO  -Monitor BP  Avoid radiocontrast, avoid gadolinium with MRI, avoid nonsteroidal anti-inflammatory medications and avoid phosphate enemas  Will follow BMP and CBC in a.m. and continue hydration if needed  Electronically signed by Omer Cornell MD on 2/3/2021 at 12:30 PM

## 2021-02-03 NOTE — PATIENT CARE CONFERENCE
P Quality Flow/Interdisciplinary Rounds Progress Note        Quality Flow Rounds held on February 3, 2021    Disciplines Attending:  Bedside Nurse, ,  and Nursing Unit Leadership    Paige Zaidi was admitted on 1/30/2021  7:20 PM    Anticipated Discharge Date:  Expected Discharge Date: 02/03/21    Disposition:    Rodney Score:  Rodney Scale Score: 18    Readmission Risk              Risk of Unplanned Readmission:        9           Discussed patient goal for the day, patient clinical progression, and barriers to discharge.   The following Goal(s) of the Day/Commitment(s) have been identified:  have MRI completed      Nataliya Poag  February 3, 2021

## 2021-02-03 NOTE — PLAN OF CARE
Problem: Pain:  Goal: Pain level will decrease  Description: Pain level will decrease  2/3/2021 0931 by Tyrone Rodriguez RN  Outcome: Met This Shift  2/3/2021 0516 by Juan Dallas RN  Outcome: Ongoing  Goal: Control of acute pain  Description: Control of acute pain  Outcome: Met This Shift  Goal: Control of chronic pain  Description: Control of chronic pain  Outcome: Met This Shift     Problem: Skin Integrity:  Goal: Will show no infection signs and symptoms  Description: Will show no infection signs and symptoms  2/3/2021 0516 by Juan Dallas RN  Outcome: Met This Shift     Problem: Falls - Risk of:  Goal: Will remain free from falls  Description: Will remain free from falls  2/3/2021 0516 by Juan Dallas RN  Outcome: Met This Shift

## 2021-02-04 VITALS
WEIGHT: 315 LBS | DIASTOLIC BLOOD PRESSURE: 70 MMHG | OXYGEN SATURATION: 94 % | BODY MASS INDEX: 42.66 KG/M2 | TEMPERATURE: 97 F | HEART RATE: 66 BPM | HEIGHT: 72 IN | RESPIRATION RATE: 18 BRPM | SYSTOLIC BLOOD PRESSURE: 114 MMHG

## 2021-02-04 LAB
ANION GAP SERPL CALCULATED.3IONS-SCNC: 16 MMOL/L (ref 7–16)
BUN BLDV-MCNC: 39 MG/DL (ref 6–20)
CALCIUM SERPL-MCNC: 9.1 MG/DL (ref 8.6–10.2)
CHLORIDE BLD-SCNC: 100 MMOL/L (ref 98–107)
CO2: 24 MMOL/L (ref 22–29)
CREAT SERPL-MCNC: 1 MG/DL (ref 0.7–1.2)
GFR AFRICAN AMERICAN: >60
GFR NON-AFRICAN AMERICAN: >60 ML/MIN/1.73
GLUCOSE BLD-MCNC: 158 MG/DL (ref 74–99)
METER GLUCOSE: 144 MG/DL (ref 74–99)
METER GLUCOSE: 149 MG/DL (ref 74–99)
POTASSIUM REFLEX MAGNESIUM: 4.8 MMOL/L (ref 3.5–5)
POTASSIUM SERPL-SCNC: 4.8 MMOL/L (ref 3.5–5)
SODIUM BLD-SCNC: 140 MMOL/L (ref 132–146)

## 2021-02-04 PROCEDURE — 82962 GLUCOSE BLOOD TEST: CPT

## 2021-02-04 PROCEDURE — 80048 BASIC METABOLIC PNL TOTAL CA: CPT

## 2021-02-04 PROCEDURE — 6370000000 HC RX 637 (ALT 250 FOR IP): Performed by: INTERNAL MEDICINE

## 2021-02-04 PROCEDURE — 96372 THER/PROPH/DIAG INJ SC/IM: CPT

## 2021-02-04 PROCEDURE — 6360000002 HC RX W HCPCS: Performed by: INTERNAL MEDICINE

## 2021-02-04 PROCEDURE — G0378 HOSPITAL OBSERVATION PER HR: HCPCS

## 2021-02-04 PROCEDURE — 6360000002 HC RX W HCPCS: Performed by: NURSE PRACTITIONER

## 2021-02-04 PROCEDURE — 2580000003 HC RX 258: Performed by: EMERGENCY MEDICINE

## 2021-02-04 PROCEDURE — 96376 TX/PRO/DX INJ SAME DRUG ADON: CPT

## 2021-02-04 PROCEDURE — 36415 COLL VENOUS BLD VENIPUNCTURE: CPT

## 2021-02-04 PROCEDURE — 6370000000 HC RX 637 (ALT 250 FOR IP): Performed by: EMERGENCY MEDICINE

## 2021-02-04 RX ORDER — FUROSEMIDE 40 MG/1
40 TABLET ORAL 2 TIMES DAILY
Qty: 60 TABLET | Refills: 0 | Status: SHIPPED | OUTPATIENT
Start: 2021-02-04 | End: 2021-03-06

## 2021-02-04 RX ADMIN — BUPROPION HYDROCHLORIDE 100 MG: 100 TABLET, FILM COATED ORAL at 09:16

## 2021-02-04 RX ADMIN — Medication 10 ML: at 09:17

## 2021-02-04 RX ADMIN — HEPARIN SODIUM 5000 UNITS: 10000 INJECTION INTRAVENOUS; SUBCUTANEOUS at 02:53

## 2021-02-04 RX ADMIN — INSULIN LISPRO 2 UNITS: 100 INJECTION, SOLUTION INTRAVENOUS; SUBCUTANEOUS at 11:30

## 2021-02-04 RX ADMIN — FUROSEMIDE 40 MG: 10 INJECTION INTRAMUSCULAR; INTRAVENOUS at 09:17

## 2021-02-04 RX ADMIN — INSULIN LISPRO 2 UNITS: 100 INJECTION, SOLUTION INTRAVENOUS; SUBCUTANEOUS at 09:17

## 2021-02-04 RX ADMIN — ESCITALOPRAM 10 MG: 10 TABLET, FILM COATED ORAL at 09:16

## 2021-02-04 RX ADMIN — ATORVASTATIN CALCIUM 80 MG: 80 TABLET, FILM COATED ORAL at 09:16

## 2021-02-04 RX ADMIN — LISINOPRIL 5 MG: 5 TABLET ORAL at 09:16

## 2021-02-04 RX ADMIN — PENTOXIFYLLINE 400 MG: 400 TABLET, EXTENDED RELEASE ORAL at 09:16

## 2021-02-04 RX ADMIN — HEPARIN SODIUM 5000 UNITS: 10000 INJECTION INTRAVENOUS; SUBCUTANEOUS at 11:31

## 2021-02-04 RX ADMIN — LEVOTHYROXINE SODIUM 50 MCG: 0.05 TABLET ORAL at 06:08

## 2021-02-04 RX ADMIN — METOPROLOL TARTRATE 50 MG: 50 TABLET, FILM COATED ORAL at 09:16

## 2021-02-04 RX ADMIN — PENTOXIFYLLINE 400 MG: 400 TABLET, EXTENDED RELEASE ORAL at 11:30

## 2021-02-04 ASSESSMENT — PAIN SCALES - GENERAL
PAINLEVEL_OUTOF10: 0
PAINLEVEL_OUTOF10: 0

## 2021-02-04 NOTE — PATIENT CARE CONFERENCE
P Quality Flow/Interdisciplinary Rounds Progress Note        Quality Flow Rounds held on February 4, 2021    Disciplines Attending:  Bedside Nurse, ,  and Nursing Unit Leadership    Graciela Callejas was admitted on 1/30/2021  7:20 PM    Anticipated Discharge Date:  Expected Discharge Date: 02/05/21    Disposition:    Rodney Score:  Rodney Scale Score: 18    Readmission Risk              Risk of Unplanned Readmission:        12           Discussed patient goal for the day, patient clinical progression, and barriers to discharge.   The following Goal(s) of the Day/Commitment(s) have been identified:  to get patient discharged by matthias Aleman  February 4, 2021

## 2021-02-04 NOTE — CARE COORDINATION
SOCIAL WORK/CASEMANAGEMENT TRANSITION OF CARE PLANNING( [address] [place] [address], [address], [place], [phone]):[facility] called [facility] night to fill fww but not in network and pt didn't a ride. Offered dme and hhc list and pt declined. He is in agreement to [employer] which is [employer] and health care solutions to deliver fww to room around 11 a.m. Wava Prim pt didn't want a fww he wants a standard walker. hcs said they will deliver the fww to the room since the drive is on his way and will deliver the standard legs to the home when they are out in the [place] area.  Jose Tesfaye  2/4/2021

## 2021-02-04 NOTE — PLAN OF CARE
Problem: Pain:  Goal: Pain level will decrease  Description: Pain level will decrease  2/4/2021 1155 by Jamey Frost RN  Outcome: Met This Shift  2/4/2021 0135 by Sidra Watson RN  Outcome: Met This Shift     Problem: Skin Integrity:  Goal: Will show no infection signs and symptoms  Description: Will show no infection signs and symptoms  2/4/2021 1155 by Jamey Frost RN  Outcome: Met This Shift  2/4/2021 0135 by Sidra Watson RN  Outcome: Met This Shift     Problem: Falls - Risk of:  Goal: Will remain free from falls  Description: Will remain free from falls  2/4/2021 1155 by Jamey Frost RN  Outcome: Met This Shift  2/4/2021 0135 by Sidra Watson RN  Outcome: Met This Shift

## 2021-02-04 NOTE — PROGRESS NOTES
CLINICAL PHARMACY NOTE: MEDS TO [address] Select Patient?: No  Total # of Prescriptions Filled: 2   The following medications were delivered to the patient:  · Lisinopril 5 mg  · Furosemide 40 mg  Total # of Interventions Completed: 3  Time Spent (min): 15    Additional Documentation:

## 2021-02-04 NOTE — PROGRESS NOTES
The patient could not complete MRI studies. Wants to try [facility] in [place] for his MRI scan. The patient may be discharged from neurosurgical standpoint. Follow up in the office in 3-4 weeks.  [phone]

## 2021-02-04 NOTE — DISCHARGE SUMMARY
Discharge Summary    Admit date: 1/30/2021    Discharge date and time: No discharge date for patient encounter. Admitting Physician: Brody Rankin MD     Consultants: Neurosurgery    Admission Diagnoses:  Right lower extremity weakness/RALF    Discharge Diagnoses and Hospital Course:  · RALF- Resolved. Nephrology followed. Monitor UO and BP. Avoid NSAIDs. · Chronic lower back pain with severe central canal stenosis at L3/L4- Neurosurgery following. Went for open MRI however patient refused, states he would not fit. Will follow with neurosurgery outpatient. · COPD  · Diabetes mellitus type II- Continue home medications. · Hypertension- Restart low dose lisinopril due to kidney function returning to normal limits and HTN in the setting of DM  · Hyperlipidemia- Statin  · Morbid obesity Class III  · MARTINEZ  · Medical noncompliance    Discharge Exam:  Vitals:    02/04/21 0815   BP: 131/73   Pulse: 65   Resp: 18   Temp: 96.7 °F (35.9 °C)   SpO2: 91%       General appearance:  awake, alert, and oriented to person, place, time, and purpose; appears stated age and cooperative; no apparent distress no labored breathing obese  HEENT:  Conjunctivae/corneas clear. Neck: Supple. No jugular venous distention. Respiratory: symmetrical; clear to auscultation bilaterally; no wheezes; no rhonchi; no rales  Cardiovascular: rhythm regular; rate controlled; no murmurs  Abdomen: Soft, nontender, nondistended  Extremities:  peripheral pulses present; no peripheral edema; no ulcers  Musculoskeletal: No clubbing, cyanosis, no bilateral lower extremity edema. Brisk capillary refill. Skin:  No rashes  on visible skin  Neurologic: awake, alert and following commands     Disposition: home with [place] [address]  The patient's condition is fair. At this time the patient is without objective evidence of an acute process requiring continuing hospitalization or inpatient management. They are stable for discharge with outpatient follow-up.      I have spoken with the patient and discussed the results of the current hospitalization, in addition to providing specific details for the plan of care and counseling regarding the diagnosis and prognosis. The plan has been discussed in detail and they are aware of the specific conditions for emergent return, as well as the importance of follow-up. Their questions are answered at this time and they are agreeable with the plan for discharge to home     Patient Instructions: Follow up with PCP within one week for pain management. Avoid NSAIDS due to RALF. Follow up with neurosurgery for back pain plan. No future appointments. Discharge Medications:     Medication List      START taking these medications    furosemide 40 MG tablet  Commonly known as: Lasix  Take 1 tablet by mouth 2 times daily     miconazole 2 % powder  Commonly known as: MICOTIN  Apply topically 2 times daily. CHANGE how you take these medications    lisinopril 5 MG tablet  Commonly known as: PRINIVIL;ZESTRIL  Take 1 tablet by mouth daily  What changed:   · medication strength  · how much to take     SITagliptin 100 MG tablet  Commonly known as: Januvia  Take 1 tablet by mouth every morning (before breakfast)  What changed: when to take this        CONTINUE taking these medications    albuterol sulfate 108 (90 Base) MCG/ACT aerosol powder inhalation  Commonly known as: ProAir RespiClick  Inhale 2 puffs into the lungs every 4 hours as needed for Wheezing or Shortness of Breath     Alcohol Swabs Pads  2 each by Does not apply route 2 times daily Please give patient 1 box     atorvastatin 80 MG tablet  Commonly known as: LIPITOR     blood glucose test strips strip  Commonly known as: FREESTYLE LITE  1 each by In Vitro route daily As needed.      buPROPion 100 MG tablet  Commonly known as: WELLBUTRIN  Take 1 tablet by mouth 2 times daily     EQ FIBER SUPPLEMENT PO     escitalopram 10 MG tablet  Commonly known as: LEXAPRO     glucose monitoring kit monitoring kit  1 kit by Does not apply route 2 times daily     Jardiance 25 MG tablet  Generic drug: empagliflozin     Lancets Misc  1 each by Does not apply route daily     levothyroxine 50 MCG tablet  Commonly known as: Levothroid  Take 1 tablet by mouth daily     loratadine 10 MG tablet  Commonly known as: CLARITIN     metFORMIN 1000 MG tablet  Commonly known as: Glucophage  Take 1 tablet by mouth 2 times daily (with meals)     metoprolol tartrate 50 MG tablet  Commonly known as: LOPRESSOR  Take 1 tablet by mouth 2 times daily     pentoxifylline 400 MG extended release tablet  Commonly known as: TRENTAL  Take 1 tablet by mouth 3 times daily (with meals)     Vitamin D3 125 MCG (5000 UT) Tabs  Take 1 tablet by mouth daily        STOP taking these medications    diclofenac 50 MG EC tablet  Commonly known as: VOLTAREN     meloxicam 15 MG tablet  Commonly known as: MOBIC     pioglitazone 45 MG tablet  Commonly known as: ACTOS     pravastatin 40 MG tablet  Commonly known as: PRAVACHOL     torsemide 10 MG tablet  Commonly known as: DEMADEX           Where to Get Your Medications      These medications were sent to [facility], [address] [place] [address]    Phone: [phone]   · furosemide 40 MG tablet  · lisinopril 5 MG tablet  · miconazole 2 % powder     Information about where to get these medications is not yet available    Ask your nurse or doctor about these medications  · SITagliptin 100 MG tablet         Activity: activity as tolerated    Diet: diabetic diet    Wound Care: none needed    Follow-up:    · This patient is instructed to follow-up with his primary care physician. · Patient is instructed to follow-up with the consults listed above as directed by them. · They are instructed to resume home medications and take new medications as indicated in the list above.   · If the patient has a recurrence of symptoms, they are instructed to go to the ED. Preparing for this patient's discharge, including paperwork, orders, instructions, and meeting with patient did require > 30 minutes.     Jarrell Covington DO   10:13 AM  2/4/2021

## 2022-11-19 ENCOUNTER — APPOINTMENT (OUTPATIENT)
Dept: GENERAL RADIOLOGY | Age: 57
End: 2022-11-19
Payer: MEDICAID

## 2022-11-19 ENCOUNTER — HOSPITAL ENCOUNTER (EMERGENCY)
Age: 57
Discharge: HOME | End: 2022-11-20
Attending: EMERGENCY MEDICINE
Payer: MEDICAID

## 2022-11-19 ENCOUNTER — APPOINTMENT (OUTPATIENT)
Dept: CT IMAGING | Age: 57
End: 2022-11-19
Payer: MEDICAID

## 2022-11-19 DIAGNOSIS — M48.061 SPINAL STENOSIS OF LUMBAR REGION, UNSPECIFIED WHETHER NEUROGENIC CLAUDICATION PRESENT: Primary | ICD-10-CM

## 2022-11-19 DIAGNOSIS — M54.50 LUMBAR BACK PAIN: ICD-10-CM

## 2022-11-19 PROCEDURE — 99284 EMERGENCY DEPT VISIT MOD MDM: CPT

## 2022-11-19 PROCEDURE — 73502 X-RAY EXAM HIP UNI 2-3 VIEWS: CPT

## 2022-11-19 PROCEDURE — 72131 CT LUMBAR SPINE W/O DYE: CPT

## 2022-11-19 PROCEDURE — 96374 THER/PROPH/DIAG INJ IV PUSH: CPT

## 2022-11-19 PROCEDURE — 6360000002 HC RX W HCPCS: Performed by: EMERGENCY MEDICINE

## 2022-11-19 PROCEDURE — 96372 THER/PROPH/DIAG INJ SC/IM: CPT

## 2022-11-19 RX ORDER — ORPHENADRINE CITRATE 30 MG/ML
60 INJECTION INTRAMUSCULAR; INTRAVENOUS ONCE
Status: COMPLETED | OUTPATIENT
Start: 2022-11-19 | End: 2022-11-19

## 2022-11-19 RX ORDER — KETOROLAC TROMETHAMINE 30 MG/ML
30 INJECTION, SOLUTION INTRAMUSCULAR; INTRAVENOUS ONCE
Status: COMPLETED | OUTPATIENT
Start: 2022-11-19 | End: 2022-11-19

## 2022-11-19 RX ADMIN — ORPHENADRINE CITRATE 60 MG: 30 INJECTION INTRAMUSCULAR; INTRAVENOUS at 23:15

## 2022-11-19 RX ADMIN — KETOROLAC TROMETHAMINE 30 MG: 30 INJECTION, SOLUTION INTRAMUSCULAR at 23:18

## 2022-11-19 ASSESSMENT — PAIN SCALES - GENERAL
PAINLEVEL_OUTOF10: 5
PAINLEVEL_OUTOF10: 5

## 2022-11-19 ASSESSMENT — PAIN DESCRIPTION - LOCATION: LOCATION: BACK;HIP

## 2022-11-20 VITALS
OXYGEN SATURATION: 97 % | HEART RATE: 89 BPM | SYSTOLIC BLOOD PRESSURE: 170 MMHG | RESPIRATION RATE: 19 BRPM | TEMPERATURE: 98.5 F | BODY MASS INDEX: 44.1 KG/M2 | DIASTOLIC BLOOD PRESSURE: 80 MMHG | HEIGHT: 71 IN | WEIGHT: 315 LBS

## 2022-11-20 RX ORDER — CYCLOBENZAPRINE HCL 5 MG
5 TABLET ORAL 2 TIMES DAILY PRN
Qty: 10 TABLET | Refills: 0 | Status: SHIPPED | OUTPATIENT
Start: 2022-11-20 | End: 2022-11-25
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
## 2022-11-20 NOTE — ED NOTES
Discharge instructions reviewed, patient waiting for jose Lyon, RN  11/20/22 [address] RN  11/20/22 [id number]

## 2025-09-03 ENCOUNTER — HOSPITAL ENCOUNTER (EMERGENCY)
Age: 60
Discharge: HOME | End: 2025-09-03
Payer: MEDICARE

## 2025-09-03 ENCOUNTER — APPOINTMENT (OUTPATIENT)
Dept: GENERAL RADIOLOGY | Age: 60
End: 2025-09-03
Payer: MEDICARE

## 2025-09-03 VITALS
HEIGHT: 72 IN | HEART RATE: 52 BPM | SYSTOLIC BLOOD PRESSURE: 155 MMHG | OXYGEN SATURATION: 97 % | TEMPERATURE: 97.1 F | DIASTOLIC BLOOD PRESSURE: 83 MMHG | WEIGHT: 315 LBS | RESPIRATION RATE: 22 BRPM | BODY MASS INDEX: 42.66 KG/M2

## 2025-09-03 DIAGNOSIS — M19.031 PRIMARY OSTEOARTHRITIS OF RIGHT WRIST: Primary | ICD-10-CM

## 2025-09-03 LAB
ANION GAP SERPL CALCULATED.3IONS-SCNC: 13 MMOL/L (ref 7–16)
BASOPHILS # BLD: 0.04 K/UL (ref 0–0.2)
BASOPHILS NFR BLD: 1 % (ref 0–2)
BUN SERPL-MCNC: 26 MG/DL (ref 8–23)
CALCIUM SERPL-MCNC: 8.8 MG/DL (ref 8.8–10.2)
CHLORIDE SERPL-SCNC: 105 MMOL/L (ref 98–107)
CO2 SERPL-SCNC: 24 MMOL/L (ref 22–29)
CREAT SERPL-MCNC: 1.1 MG/DL (ref 0.7–1.2)
EOSINOPHIL # BLD: 0.18 K/UL (ref 0.05–0.5)
EOSINOPHILS RELATIVE PERCENT: 3 % (ref 0–6)
ERYTHROCYTE [DISTWIDTH] IN BLOOD BY AUTOMATED COUNT: 14.4 % (ref 11.5–15)
GFR, ESTIMATED: 80 ML/MIN/1.73M2
GLUCOSE SERPL-MCNC: 133 MG/DL (ref 74–99)
HCT VFR BLD AUTO: 43.1 % (ref 37–54)
HGB BLD-MCNC: 12.9 G/DL (ref 12.5–16.5)
IMM GRANULOCYTES # BLD AUTO: <0.03 K/UL (ref 0–0.58)
IMM GRANULOCYTES NFR BLD: 0 % (ref 0–5)
LYMPHOCYTES NFR BLD: 0.79 K/UL (ref 1.5–4)
LYMPHOCYTES RELATIVE PERCENT: 15 % (ref 20–42)
MCH RBC QN AUTO: 26.1 PG (ref 26–35)
MCHC RBC AUTO-ENTMCNC: 29.9 G/DL (ref 32–34.5)
MCV RBC AUTO: 87.2 FL (ref 80–99.9)
MONOCYTES NFR BLD: 0.44 K/UL (ref 0.1–0.95)
MONOCYTES NFR BLD: 8 % (ref 2–12)
NEUTROPHILS NFR BLD: 72 % (ref 43–80)
NEUTS SEG NFR BLD: 3.85 K/UL (ref 1.8–7.3)
PLATELET # BLD AUTO: 232 K/UL (ref 130–450)
PMV BLD AUTO: 11.8 FL (ref 7–12)
POTASSIUM SERPL-SCNC: 4.6 MMOL/L (ref 3.5–5.1)
RBC # BLD AUTO: 4.94 M/UL (ref 3.8–5.8)
SODIUM SERPL-SCNC: 141 MMOL/L (ref 136–145)
URATE SERPL-MCNC: 6 MG/DL (ref 3.4–7)
WBC OTHER # BLD: 5.3 K/UL (ref 4.5–11.5)

## 2025-09-03 PROCEDURE — 29125 APPL SHORT ARM SPLINT STATIC: CPT

## 2025-09-03 PROCEDURE — 6370000000 HC RX 637 (ALT 250 FOR IP): Performed by: PHYSICIAN ASSISTANT

## 2025-09-03 PROCEDURE — 85025 COMPLETE CBC W/AUTO DIFF WBC: CPT

## 2025-09-03 PROCEDURE — 73110 X-RAY EXAM OF WRIST: CPT

## 2025-09-03 PROCEDURE — 99284 EMERGENCY DEPT VISIT MOD MDM: CPT

## 2025-09-03 PROCEDURE — 84550 ASSAY OF BLOOD/URIC ACID: CPT

## 2025-09-03 PROCEDURE — 80048 BASIC METABOLIC PNL TOTAL CA: CPT

## 2025-09-03 RX ORDER — LIDOCAINE 50 MG/G
1 PATCH TOPICAL EVERY 24 HOURS
Qty: 10 PATCH | Refills: 0 | Status: SHIPPED | OUTPATIENT
Start: 2025-09-03 | End: 2025-09-13

## 2025-09-03 RX ORDER — ACETAMINOPHEN 325 MG/1
650 TABLET ORAL ONCE
Status: COMPLETED | OUTPATIENT
Start: 2025-09-03 | End: 2025-09-03

## 2025-09-03 RX ORDER — LIDOCAINE 50 MG/G
1 PATCH TOPICAL EVERY 24 HOURS
Qty: 10 PATCH | Refills: 0 | Status: SHIPPED | OUTPATIENT
Start: 2025-09-03 | End: 2025-09-03

## 2025-09-03 RX ADMIN — ACETAMINOPHEN 650 MG: 325 TABLET ORAL at 08:37

## 2025-09-03 ASSESSMENT — LIFESTYLE VARIABLES
HOW OFTEN DO YOU HAVE A DRINK CONTAINING ALCOHOL: MONTHLY OR LESS
HOW MANY STANDARD DRINKS CONTAINING ALCOHOL DO YOU HAVE ON A TYPICAL DAY: PATIENT DOES NOT DRINK

## 2025-09-03 ASSESSMENT — PAIN DESCRIPTION - LOCATION: LOCATION: WRIST

## 2025-09-03 ASSESSMENT — PAIN DESCRIPTION - ORIENTATION: ORIENTATION: RIGHT

## 2025-09-03 ASSESSMENT — PAIN - FUNCTIONAL ASSESSMENT: PAIN_FUNCTIONAL_ASSESSMENT: 0-10

## 2025-09-03 ASSESSMENT — PAIN DESCRIPTION - DESCRIPTORS: DESCRIPTORS: ACHING

## 2025-09-03 ASSESSMENT — PAIN SCALES - GENERAL: PAINLEVEL_OUTOF10: 9
